# Patient Record
Sex: FEMALE | Race: ASIAN | NOT HISPANIC OR LATINO | Employment: OTHER | URBAN - METROPOLITAN AREA
[De-identification: names, ages, dates, MRNs, and addresses within clinical notes are randomized per-mention and may not be internally consistent; named-entity substitution may affect disease eponyms.]

---

## 2017-01-07 DIAGNOSIS — F41.1 GENERALIZED ANXIETY DISORDER: Primary | ICD-10-CM

## 2017-01-09 RX ORDER — SERTRALINE HYDROCHLORIDE 100 MG/1
TABLET, FILM COATED ORAL
Qty: 90 TABLET | Refills: 0 | Status: SHIPPED | OUTPATIENT
Start: 2017-01-09 | End: 2018-07-25

## 2017-01-09 NOTE — TELEPHONE ENCOUNTER
OK'd one fill.   Needs to be seen - please have her schedule a OV in next 2 months.   GARLAND Brown, PA-C

## 2017-01-09 NOTE — TELEPHONE ENCOUNTER
Sertraline     Last Written Prescription Date: 8/2/16  Last Fill Quantity: 90, # refills: 1  Last Office Visit with [facility] primary care provider:  3/14/ 16       Last PHQ-9 score on record=   PHQ-9 SCORE 3/14/2016   Total Score 15                 Nell Knapp CMA

## 2017-01-09 NOTE — TELEPHONE ENCOUNTER
Routing refill request to provider for review/approval because:  Patient needs to be seen because:  Last office visit on 03/14/16 states to be seen in 6 months or sooner.  Dx is anxiety      Leanne Chin,RN  [facility]  [phone]

## 2017-01-13 NOTE — TELEPHONE ENCOUNTER
Patient returned call and was given the message below  States she will be starting school soon and will be without a car  Looking to change PCP's and may just go to a [facility] closer to her school  She will call back  Marita FELIX

## 2017-04-07 DIAGNOSIS — F41.1 GENERALIZED ANXIETY DISORDER: ICD-10-CM

## 2017-04-07 NOTE — TELEPHONE ENCOUNTER
Sertraline     Last Written Prescription Date: 1/9/17  Last Fill Quantity: 90, # refills: 0  Last Office Visit with [facility] primary care provider:  3/14/16        Last PHQ-9 score on record=   PHQ-9 SCORE 3/14/2016   Total Score 15             Nell Knapp CMA

## 2017-04-07 NOTE — LETTER
[facility]  [address] [place] [address]  [phone]        April 20, 2017  Ness Alamo  [address] [facility] [address]    Dear Ness,    I care about your health and have reviewed your health plan. I have reviewed your medical conditions, medication list, and lab results and am making recommendations based on this review, to better manage your health.    You are in particular need of attention regarding:  -Wellness (Physical) Visit     I am recommending that you:  Schedule a physical or a med check - it has been over a year since your last visit  Here is a list of Health Maintenance topics that are due now or due soon:  Health Maintenance Due   Topic Date Due     HPV IMMUNIZATION (1 of 3 - Female 3 Dose Series) 11/09/2005     Chlamydia Screening Lab - yearly  03/14/2017     RICHELLE QUESTIONNAIRE 1 YEAR  03/14/2017     Tetanus Vaccine - every 10 years  05/07/2017       Please call us at [phone] (or use [employer]) to address the above recommendations.     Thank you for trusting [facility] and we appreciate the opportunity to serve you.  We look forward to supporting your healthcare needs in the future.    Healthy Regards,    Ana Lai PA-C

## 2017-04-07 NOTE — TELEPHONE ENCOUNTER
Left message for patient to call clinic.  Please assist with appointment.  Thank you    Kayce Degroot RN

## 2017-04-07 NOTE — TELEPHONE ENCOUNTER
Routing refill request to provider for review/approval because:  Labs out of range:  PHQ -9 greater than 4    Kayce Degroot RN

## 2017-04-20 RX ORDER — SERTRALINE HYDROCHLORIDE 100 MG/1
TABLET, FILM COATED ORAL
Qty: 90 TABLET | Refills: 1 | OUTPATIENT
Start: 2017-04-20

## 2018-07-25 ENCOUNTER — OFFICE VISIT (OUTPATIENT)
Dept: FAMILY MEDICINE | Facility: CLINIC | Age: 23
End: 2018-07-25
Payer: COMMERCIAL

## 2018-07-25 VITALS
SYSTOLIC BLOOD PRESSURE: 114 MMHG | DIASTOLIC BLOOD PRESSURE: 70 MMHG | TEMPERATURE: 97.7 F | BODY MASS INDEX: 28.17 KG/M2 | WEIGHT: 159 LBS | HEART RATE: 76 BPM

## 2018-07-25 DIAGNOSIS — F41.1 GENERALIZED ANXIETY DISORDER: Primary | ICD-10-CM

## 2018-07-25 PROCEDURE — 99214 OFFICE O/P EST MOD 30 MIN: CPT | Performed by: FAMILY MEDICINE

## 2018-07-25 RX ORDER — SERTRALINE HYDROCHLORIDE 100 MG/1
150 TABLET, FILM COATED ORAL DAILY
Qty: 90 TABLET | Refills: 2 | Status: SHIPPED | OUTPATIENT
Start: 2018-07-25 | End: 2019-01-20

## 2018-07-25 ASSESSMENT — PATIENT HEALTH QUESTIONNAIRE - PHQ9: 5. POOR APPETITE OR OVEREATING: SEVERAL DAYS

## 2018-07-25 ASSESSMENT — ANXIETY QUESTIONNAIRES
IF YOU CHECKED OFF ANY PROBLEMS ON THIS QUESTIONNAIRE, HOW DIFFICULT HAVE THESE PROBLEMS MADE IT FOR YOU TO DO YOUR WORK, TAKE CARE OF THINGS AT HOME, OR GET ALONG WITH OTHER PEOPLE: NOT DIFFICULT AT ALL
1. FEELING NERVOUS, ANXIOUS, OR ON EDGE: SEVERAL DAYS
5. BEING SO RESTLESS THAT IT IS HARD TO SIT STILL: NOT AT ALL
3. WORRYING TOO MUCH ABOUT DIFFERENT THINGS: NOT AT ALL
2. NOT BEING ABLE TO STOP OR CONTROL WORRYING: SEVERAL DAYS
GAD7 TOTAL SCORE: 3
6. BECOMING EASILY ANNOYED OR IRRITABLE: NOT AT ALL
7. FEELING AFRAID AS IF SOMETHING AWFUL MIGHT HAPPEN: NOT AT ALL

## 2018-07-25 NOTE — PROGRESS NOTES
SUBJECTIVE:   Ness Alamo is a 22 year old female who presents to clinic today for the following health issues:      Depression and Anxiety Follow-Up    Status since last visit: has been on 150mg for about one year now - increased by doc outside of [place]     Other associated symptoms:None    Complicating factors:     Significant life event: No - on college so moves a lot      Current substance abuse: None    PHQ-9 8/27/2015 3/14/2016 7/25/2018   Total Score 11 15 10   Q9: Suicide Ideation Not at all Not at all Not at all     RICHELLE-7 SCORE 8/27/2015 3/14/2016 7/25/2018   Total Score 19 9 3       PHQ-9  English  PHQ-9   Any Language  RICHELLE-7  Suicide Assessment Five-step Evaluation and Treatment (SAFE-T)    Amount of exercise or physical activity: None    Problems taking medications regularly: No    Medication side effects: none    Diet: non dairy     She denies any side effects of the medication.  She thinks her anxiety is better but at times she feels slightly depressed but no suicidal thoughts.      Problem list and histories reviewed & adjusted, as indicated.  Additional history: as documented        Reviewed and updated as needed this visit by clinical staff  Tobacco  Allergies  Meds  Fam Hx  Soc Hx      Reviewed and updated as needed this visit by Provider         ROS:  CONSTITUTIONAL: NEGATIVE for fever, chills, change in weight  ENT/MOUTH: NEGATIVE for ear, mouth and throat problems  RESP: NEGATIVE for significant cough or SOB  CV: NEGATIVE for chest pain, palpitations or peripheral edema  PSYCHIATRIC: NEGATIVE for changes in mood or affect    OBJECTIVE:                                                    /70  Pulse 76  Temp 97.7  F (36.5  C) (Tympanic)  Wt 159 lb (72.1 kg)  BMI 28.17 kg/m2  Body mass index is 28.17 kg/(m^2).   GENERAL: healthy, alert, well nourished, well hydrated, no distress  NECK: no tenderness, no adenopathy, no asymmetry, no masses, no stiffness; thyroid- normal to  palpation  RESP: lungs clear to auscultation - no rales, no rhonchi, no wheezes  CV: regular rates and rhythm, normal S1 S2, no S3 or S4 and no murmur, no click or rub -  PSYCH: Alert and oriented times 3; speech- coherent , normal rate and volume; able to articulate logical thoughts, able to abstract reason, no tangential thoughts, no hallucinations or delusions, affect- normal         ASSESSMENT/PLAN:                                                        ICD-10-CM    1. Generalized anxiety disorder F41.1 sertraline (ZOLOFT) 100 MG tablet       Patient has underlying generalized anxiety issues.  She is currently on 150 mg of Zoloft.  She thinks it has been helping.  I refilled her medication 450 mg.  She is on a high dose of medication I will suggest to follow-up every 6 months for recheck in person.  Side effects were discussed with the patient in detail.    Tello Kelley MD  [facility]

## 2018-07-25 NOTE — MR AVS SNAPSHOT
After Visit Summary   7/25/2018    Ness Alamo    MRN: [id number]           Patient Information     Date Of Birth          1995        Visit Information        Provider Department      7/25/2018 4:20 PM Tello Kelley MD [facility] [place]        Today's Diagnoses     Generalized anxiety disorder    -  1       Follow-ups after your visit        Follow-up notes from your care team     Return in about 6 months (around 1/25/2019) for Mood recheck.      Your next 10 appointments already scheduled     Jan 02, 2019 12:20 PM CST   Office Visit with Tello Kelley MD   [facility] [place] ([facility])    [address] [place] [address] [phone]           Bring a current list of meds and any records pertaining to this visit. For Physicals, please bring immunization records and any forms needing to be filled out. Please arrive 10 minutes early to complete paperwork.              Who to contact     If you have questions or need follow up information about today's clinic visit or your schedule please contact [facility] [place] directly at [phone].  Normal or non-critical lab and imaging results will be communicated to you by MyChart, letter or phone within 4 business days after the clinic has received the results. If you do not hear from us within 7 days, please contact the clinic through SABIAhart or phone. If you have a critical or abnormal lab result, we will notify you by phone as soon as possible.  Submit refill requests through Perfectore or call your pharmacy and they will forward the refill request to us. Please allow 3 business days for your refill to be completed.          Additional Information About Your Visit        MyChart Information     Perfectore gives you secure access to your electronic health record. If you see a primary care provider, you can also send messages to your care team and make appointments. If you have  questions, please call your primary care clinic.  If you do not have a primary care provider, please call [phone] and they will assist you.        Care EveryWhere ID     This is your Care EveryWhere ID. This could be used by other organizations to access your [place] medical records  [id number]        Your Vitals Were     Pulse Temperature BMI (Body Mass Index)             76 97.7  F (36.5  C) (Tympanic) 28.17 kg/m2          Blood Pressure from Last 3 Encounters:   07/25/18 114/70   03/14/16 108/58   08/27/15 124/70    Weight from Last 3 Encounters:   07/25/18 159 lb (72.1 kg)   03/14/16 141 lb (64 kg)   08/27/15 136 lb (61.7 kg)              Today, you had the following     No orders found for display         Today's Medication Changes          These changes are accurate as of 7/25/18  4:34 PM.  If you have any questions, ask your nurse or doctor.               These medicines have changed or have updated prescriptions.        Dose/Directions    sertraline 100 MG tablet   Commonly known as:  ZOLOFT   This may have changed:  See the new instructions.   Used for:  Generalized anxiety disorder   Changed by:  Tello Kelley MD        Dose:  150 mg   Take 1.5 tablets (150 mg) by mouth daily   Quantity:  90 tablet   Refills:  2            Where to get your medicines      These medications were sent to [facility] [address] [address]     Phone:  [phone]     sertraline 100 MG tablet                Primary Care Provider Fax #    Provider Not In System [phone]                Equal Access to Services     [facility] Center[facility]ESSICA : Hadbroderick webb Someggan, waaxda luqadaha, qaybta kaalmapurvi moreno. So [facility] [phone].    ATENCIÓN: Si habla español, tiene a anthony disposición servicios gratuitos de asistencia lingüística. Llame al [phone].    We comply with applicable federal civil rights  laws and [place] laws. We do not discriminate on the basis of race, color, national origin, age, disability, sex, sexual orientation, or gender identity.            Thank you!     Thank you for choosing [facility] [place]  for your care. Our goal is always to provide you with excellent care. Hearing back from our patients is one way we can continue to improve our services. Please take a few minutes to complete the written survey that you may receive in the mail after your visit with us. Thank you!             Your Updated Medication List - Protect others around you: Learn how to safely use, store and throw away your medicines at www.disposemymeds.org.          This list is accurate as of 7/25/18  4:34 PM.  Always use your most recent med list.                   Brand Name Dispense Instructions for use Diagnosis    norgestim-eth estrad triphasic 0.18/0.215/0.25 MG-35 MCG per tablet    TRINESSA (28)    84 tablet    Take 1 tablet by mouth daily    Excessive or frequent menstruation       sertraline 100 MG tablet    ZOLOFT    90 tablet    Take 1.5 tablets (150 mg) by mouth daily    Generalized anxiety disorder

## 2018-07-26 ASSESSMENT — ANXIETY QUESTIONNAIRES: GAD7 TOTAL SCORE: 3

## 2018-07-26 ASSESSMENT — PATIENT HEALTH QUESTIONNAIRE - PHQ9: SUM OF ALL RESPONSES TO PHQ QUESTIONS 1-9: 10

## 2018-08-29 ENCOUNTER — OFFICE VISIT (OUTPATIENT)
Dept: FAMILY MEDICINE | Facility: CLINIC | Age: 23
End: 2018-08-29
Payer: COMMERCIAL

## 2018-08-29 VITALS
WEIGHT: 159.8 LBS | HEIGHT: 63 IN | SYSTOLIC BLOOD PRESSURE: 109 MMHG | HEART RATE: 83 BPM | BODY MASS INDEX: 28.31 KG/M2 | DIASTOLIC BLOOD PRESSURE: 68 MMHG | OXYGEN SATURATION: 95 %

## 2018-08-29 DIAGNOSIS — L72.9 SKIN CYST: Primary | ICD-10-CM

## 2018-08-29 PROCEDURE — 99213 OFFICE O/P EST LOW 20 MIN: CPT | Performed by: FAMILY MEDICINE

## 2018-08-29 NOTE — MR AVS SNAPSHOT
After Visit Summary   8/29/2018    Ness Alamo    MRN: [id number]           Patient Information     Date Of Birth          1995        Visit Information        Provider Department      8/29/2018 10:20 AM Tello Kelley MD [facility] [place]        Today's Diagnoses     Skin cyst    -  1       Follow-ups after your visit        Follow-up notes from your care team     Return in about 2 weeks (around 9/12/2018) for if symptom does not get better.      Your next 10 appointments already scheduled     Jan 02, 2019 12:20 PM CST   Office Visit with Tello Kelley MD   [facility] [place] ([facility]    [address] [address] [phone]           Bring a current list of meds and any records pertaining to this visit. For Physicals, please bring immunization records and any forms needing to be filled out. Please arrive 10 minutes early to complete paperwork.              Who to contact     If you have questions or need follow up information about today's clinic visit or your schedule please contact [facility] [place] directly at [phone].  Normal or non-critical lab and imaging results will be communicated to you by MyChart, letter or phone within 4 business days after the clinic has received the results. If you do not hear from us within 7 days, please contact the clinic through Arkeia Softwarehart or phone. If you have a critical or abnormal lab result, we will notify you by phone as soon as possible.  Submit refill requests through Startcapps or call your pharmacy and they will forward the refill request to us. Please allow 3 business days for your refill to be completed.          Additional Information About Your Visit        MyChart Information     Startcapps gives you secure access to your electronic health record. If you see a primary care provider, you can also send messages to your care team and make appointments. If you have  "questions, please call your primary care clinic.  If you do not have a primary care provider, please call [phone] and they will assist you.        Care EveryWhere ID     This is your Care EveryWhere ID. This could be used by other organizations to access your [place] medical records  [id number]        Your Vitals Were     Pulse Height Pulse Oximetry Breastfeeding? BMI (Body Mass Index)       83 5' 3\" (1.6 m) 95% No 28.31 kg/m2        Blood Pressure from Last 3 Encounters:   08/29/18 109/68   07/25/18 114/70   03/14/16 108/58    Weight from Last 3 Encounters:   08/29/18 159 lb 12.8 oz (72.5 kg)   07/25/18 159 lb (72.1 kg)   03/14/16 141 lb (64 kg)              Today, you had the following     No orders found for display       Primary Care Provider Office Phone # Fax #    Tello Kelley [phone]       [address]  [place] [address]        Equal Access to Services     [facility]: Hadii aad ku hadasho Soomaali, waaxda luqadaha, qaybta kaalmada adeegyada, waxолег payne . So [facility] [phone].    ATENCIÓN: Si habla español, tiene a anthony disposición servicios gratuitos de asistencia lingüística. Llame al [phone].    We comply with applicable federal civil rights laws and [place] laws. We do not discriminate on the basis of race, color, national origin, age, disability, sex, sexual orientation, or gender identity.            Thank you!     Thank you for choosing [facility] [place]  for your care. Our goal is always to provide you with excellent care. Hearing back from our patients is one way we can continue to improve our services. Please take a few minutes to complete the written survey that you may receive in the mail after your visit with us. Thank you!             Your Updated Medication List - Protect others around you: Learn how to safely use, store and throw away your medicines at www.disposemymeds.org.          This list is accurate " as of 8/29/18 10:58 AM.  Always use your most recent med list.                   Brand Name Dispense Instructions for use Diagnosis    sertraline 100 MG tablet    ZOLOFT    90 tablet    Take 1.5 tablets (150 mg) by mouth daily    Generalized anxiety disorder

## 2018-08-29 NOTE — PROGRESS NOTES
SUBJECTIVE:   Ness Alamo is a 22 year old female who presents to clinic today for the following health issues:      Concern - Derm problem   Onset: x 3-4 weeks     Description:   Location - behind rt ear   Notice some discomfort, itchy, sensitive to the touch, round, mild discoloration, mild redness     Intensity: 0/10    Progression of Symptoms:  same    Accompanying Signs & Symptoms:  Denies pus, drainage, bleeding, fever, chills, headaches,     Previous history of similar problem:   YES     Precipitating factors:   Worsened by:     Alleviating factors:  Improved by:     Therapies Tried and outcome: Warm compress- shows mild improvement         Problem list and histories reviewed & adjusted, as indicated.  Additional history: as documented    Patient Active Problem List   Diagnosis     Exotropia     CARDIOVASCULAR SCREENING; LDL GOAL LESS THAN 160     Contraceptive management     Generalized anxiety disorder     Past Surgical History:   Procedure Laterality Date     EYE SURGERY Left 2004    Fix lazy eye       Social History   Substance Use Topics     Smoking status: Never Smoker     Smokeless tobacco: Never Used     Alcohol use 0.0 oz/week     0 Standard drinks or equivalent per week     Family History   Problem Relation Age of Onset     Diabetes Father      Hypertension Father      HEART DISEASE Maternal Grandfather      MI         Current Outpatient Prescriptions   Medication Sig Dispense Refill     sertraline (ZOLOFT) 100 MG tablet Take 1.5 tablets (150 mg) by mouth daily 90 tablet 2       Reviewed and updated as needed this visit by clinical staff       Reviewed and updated as needed this visit by Provider         ROS:  CONSTITUTIONAL: NEGATIVE for fever, chills, change in weight  ENT/MOUTH: NEGATIVE for ear, mouth and throat problems  RESP: NEGATIVE for significant cough or SOB  CV: NEGATIVE for chest pain, palpitations or peripheral edema    OBJECTIVE:                                                    BP  "109/68 (BP Location: Left arm, Patient Position: Chair, Cuff Size: Adult Regular)  Pulse 83  Ht 5' 3\" (1.6 m)  Wt 159 lb 12.8 oz (72.5 kg)  SpO2 95%  Breastfeeding? No  BMI 28.31 kg/m2  Body mass index is 28.31 kg/(m^2).   GENERAL: healthy, alert, well nourished, well hydrated, no distress  HENT: Small cyst behind the ear which is not erythematous.  No bulging.  NECK: no tenderness, no adenopathy, no asymmetry, no masses, no stiffness; thyroid- normal to palpation  RESP: lungs clear to auscultation - no rales, no rhonchi, no wheezes  CV: regular rates and rhythm, normal S1 S2, no S3 or S4 and no murmur, no click or rub -         ASSESSMENT/PLAN:                                                        ICD-10-CM    1. Skin cyst L72.9        Patient has a benign cyst behind the ear .  I would suggest just continue observation.  Currently there is no fluctuation or any bulge which can be opened and removed.  It is already improving she has poked it yesterday and she noticed relief.  This getting bigger in size or any pain she will let us know we will make further recommendations.  Currently I would suggest observation.    Tello Kelley MD  [facility]  "

## 2019-01-20 DIAGNOSIS — F41.1 GENERALIZED ANXIETY DISORDER: ICD-10-CM

## 2019-01-21 RX ORDER — SERTRALINE HYDROCHLORIDE 100 MG/1
TABLET, FILM COATED ORAL
Qty: 135 TABLET | Refills: 0 | Status: SHIPPED | OUTPATIENT
Start: 2019-01-21 | End: 2019-10-15

## 2019-01-21 NOTE — TELEPHONE ENCOUNTER
Medication is being filled for 1 time refill only due to:  patient is due for med check this month Violeta Covarrubias RN- Triage [employer]

## 2019-01-21 NOTE — TELEPHONE ENCOUNTER
"Requested Prescriptions   Pending Prescriptions Disp Refills     sertraline (ZOLOFT) 100 MG tablet [Pharmacy Med Name: SERTRALINE HCL TABS 100MG] 90 tablet 2     Sig: TAKE ONE AND ONE-HALF TABLETS DAILY    SSRIs Protocol Passed - 1/20/2019 11:56 PM       Passed - Recent (12 mo) or future (30 days) visit within the authorizing provider's specialty    Patient had office visit in the last 12 months or has a visit in the next 30 days with authorizing provider or within the authorizing provider's specialty.  See \"Patient Info\" tab in inbasket, or \"Choose Columns\" in Meds & Orders section of the refill encounter.             Passed - Medication is active on med list       Passed - Patient is age 18 or older       Passed - No active pregnancy on record       Passed - No positive pregnancy test in last 12 months        sertraline (ZOLOFT) 100 MG tablet 90 tablet 2 7/25/2018       Last Written Prescription Date:  07/25/2018  Last Fill Quantity: 90,  # refills: 2   Last office visit: 8/29/2018 with prescribing provider:  Dr. Kelley   Future Office Visit:  Unknown     "

## 2019-08-22 ENCOUNTER — TELEPHONE (OUTPATIENT)
Dept: FAMILY MEDICINE | Facility: CLINIC | Age: 24
End: 2019-08-22

## 2019-08-22 NOTE — LETTER
August 22, 2019      Ness Alamo  [address]  [place] [address]        Dear Ness,    I care about your health and have reviewed your health plan. I have reviewed your medical conditions, medication list, and lab results and am making recommendations based on this review, to better manage your health.    You are in particular need of attention regarding:  -Cervical Cancer Screening    I am recommending that you:  -schedule a PHYSICAL/PAP SMEAR which is due    Here is a list of Health Maintenance topics that are due now or due soon:  Health Maintenance Due   Topic Date Due     Chlamydia Screening  1995     HIV Screening  11/09/2009     HPV Vaccine (1 - Female 3-dose series) 11/09/2009     Preventive Care Visit  10/31/2015     Depression Assessment  01/25/2019     PAP Smear  03/14/2019     Anxiety Assessment  07/25/2019       Please call us at [phone] (or use Searchmetrics) to address the above recommendations.     Thank you for trusting [facility] and we appreciate the opportunity to serve you.  We look forward to supporting your healthcare needs in the future.    Healthy Regards,    Tello Kelley MD/km

## 2019-08-22 NOTE — TELEPHONE ENCOUNTER
Needs of attention regarding:  -Cervical Cancer Screening    Health Maintenance Topics with due status: Overdue       Topic Date Due    CHLAMYDIA SCREENING 1995    HIV SCREENING 11/09/2009    HPV IMMUNIZATION 11/09/2009    PREVENTIVE CARE VISIT 10/31/2015    PHQ-9 01/25/2019    PAP 03/14/2019    RICHELLE ASSESSMENT 07/25/2019       Communication:  See Letter

## 2019-10-03 ENCOUNTER — HEALTH MAINTENANCE LETTER (OUTPATIENT)
Age: 24
End: 2019-10-03

## 2019-10-15 ENCOUNTER — TELEPHONE (OUTPATIENT)
Dept: FAMILY MEDICINE | Facility: CLINIC | Age: 24
End: 2019-10-15

## 2019-10-15 ENCOUNTER — OFFICE VISIT (OUTPATIENT)
Dept: FAMILY MEDICINE | Facility: CLINIC | Age: 24
End: 2019-10-15
Payer: COMMERCIAL

## 2019-10-15 VITALS
TEMPERATURE: 98.2 F | OXYGEN SATURATION: 98 % | BODY MASS INDEX: 30.83 KG/M2 | HEIGHT: 63 IN | HEART RATE: 80 BPM | SYSTOLIC BLOOD PRESSURE: 106 MMHG | DIASTOLIC BLOOD PRESSURE: 68 MMHG | WEIGHT: 174 LBS

## 2019-10-15 DIAGNOSIS — B37.31 VULVAR CANDIDIASIS: ICD-10-CM

## 2019-10-15 DIAGNOSIS — F41.1 GENERALIZED ANXIETY DISORDER: ICD-10-CM

## 2019-10-15 DIAGNOSIS — F32.1 CURRENT MODERATE EPISODE OF MAJOR DEPRESSIVE DISORDER, UNSPECIFIED WHETHER RECURRENT (H): ICD-10-CM

## 2019-10-15 DIAGNOSIS — B37.31 VULVAR CANDIDIASIS: Primary | ICD-10-CM

## 2019-10-15 DIAGNOSIS — N92.0 MENORRHAGIA WITH REGULAR CYCLE: ICD-10-CM

## 2019-10-15 LAB
SPECIMEN SOURCE: NORMAL
WET PREP SPEC: NORMAL

## 2019-10-15 PROCEDURE — 87210 SMEAR WET MOUNT SALINE/INK: CPT | Performed by: FAMILY MEDICINE

## 2019-10-15 PROCEDURE — 99214 OFFICE O/P EST MOD 30 MIN: CPT | Performed by: FAMILY MEDICINE

## 2019-10-15 RX ORDER — NYSTATIN 100000 U/G
CREAM TOPICAL
Qty: 30 G | Refills: 0 | Status: SHIPPED | OUTPATIENT
Start: 2019-10-15 | End: 2020-03-05

## 2019-10-15 RX ORDER — SERTRALINE HYDROCHLORIDE 100 MG/1
150 TABLET, FILM COATED ORAL DAILY
Qty: 135 TABLET | Refills: 3 | Status: SHIPPED | OUTPATIENT
Start: 2019-10-15 | End: 2020-03-05

## 2019-10-15 RX ORDER — BUSPIRONE HYDROCHLORIDE 15 MG/1
15 TABLET ORAL 2 TIMES DAILY
Qty: 180 TABLET | Refills: 3 | Status: SHIPPED | OUTPATIENT
Start: 2019-10-15 | End: 2020-03-05

## 2019-10-15 RX ORDER — NYSTATIN 100000 U/G
CREAM TOPICAL
Qty: 30 G | Refills: 0 | Status: SHIPPED | OUTPATIENT
Start: 2019-10-15 | End: 2019-10-15

## 2019-10-15 RX ORDER — BUSPIRONE HYDROCHLORIDE 15 MG/1
15 TABLET ORAL 2 TIMES DAILY
COMMUNITY
End: 2019-10-15

## 2019-10-15 ASSESSMENT — ANXIETY QUESTIONNAIRES
3. WORRYING TOO MUCH ABOUT DIFFERENT THINGS: NEARLY EVERY DAY
IF YOU CHECKED OFF ANY PROBLEMS ON THIS QUESTIONNAIRE, HOW DIFFICULT HAVE THESE PROBLEMS MADE IT FOR YOU TO DO YOUR WORK, TAKE CARE OF THINGS AT HOME, OR GET ALONG WITH OTHER PEOPLE: EXTREMELY DIFFICULT
1. FEELING NERVOUS, ANXIOUS, OR ON EDGE: NEARLY EVERY DAY
7. FEELING AFRAID AS IF SOMETHING AWFUL MIGHT HAPPEN: MORE THAN HALF THE DAYS
6. BECOMING EASILY ANNOYED OR IRRITABLE: NEARLY EVERY DAY
GAD7 TOTAL SCORE: 18
5. BEING SO RESTLESS THAT IT IS HARD TO SIT STILL: MORE THAN HALF THE DAYS
2. NOT BEING ABLE TO STOP OR CONTROL WORRYING: MORE THAN HALF THE DAYS

## 2019-10-15 ASSESSMENT — MIFFLIN-ST. JEOR: SCORE: 1508.39

## 2019-10-15 ASSESSMENT — PATIENT HEALTH QUESTIONNAIRE - PHQ9
SUM OF ALL RESPONSES TO PHQ QUESTIONS 1-9: 18
5. POOR APPETITE OR OVEREATING: NEARLY EVERY DAY

## 2019-10-15 NOTE — TELEPHONE ENCOUNTER
Patient is requesting to  a paper copy of: nystatin (MYCOSTATIN) [id number] UNIT/GM external cream. Pharmacy has not received this due to our eprescribing system being down.    Please place at  for patient.    Thanks!  Cirilo Nicole

## 2019-10-15 NOTE — PATIENT INSTRUCTIONS
[facility]     Discharged by : Georgette ARAYA MA    If you have any questions regarding your visit please contact your care team:     Team Larisa              Clinic Hours Telephone Number     Dr. Zac Dobson, [place]   7am-7pm  Monday - Thursday   7am-5pm  Fridays  [phone]   (Appointment scheduling available 24/7)     RN Line  [phone] option 2     Urgent Care - [place] and [place] - 11am-9pm Monday-Friday Saturday-Sunday- 9am-5pm     [place] -   5pm-9pm Monday-Friday Saturday-Sunday- 9am-5pm    [phone] - [place]    [phone] - [place]     For a Price Quote for your services, please call our [employer] Price Line at [phone].     What options do I have for visits at the clinic other than the traditional office visit?     To expand how we care for you, many of our providers are utilizing electronic visits (e-visits) and telephone visits, when medically appropriate, for interactions with their patients rather than a visit in the clinic. We also offer nurse visits for many medical concerns. Just like any other service, we will bill your insurance company for this type of visit based on time spent on the phone with your provider. Not all insurance companies cover these visits. Please check with your medical insurance if this type of visit is covered. You will be responsible for any charges that are not paid by your insurance.     E-visits via Pigit: generally incur a $45.00 fee.     Telephone visits:  Time spent on the phone: *charged based on time that is spent on the phone in increments of 10 minutes. Estimated cost:   5-10 mins $30.00   11-20 mins. $59.00   21-30 mins. $85.00       Use Pole Start (secure email communication and access to your chart) to send your primary care provider a message or make an appointment. Ask someone on your Team how to sign up for Pole Start.     As always, Thank you for trusting us with  your health care needs!      [facility]:    Scheduling Appointments  Thony Anderson [facility]  Call: [phone]    D HanisImelda garcia, [facility]  Call: [phone]    [facility]  Call: [phone]    For Gastroenterology referrals   [facility]   Clinics and Surgery Center, 4th Floor   [address] [place] [address]   Appointments: [phone]    WHERE TO GO FOR CARE?    Clinic    Make an appointment if you:       Are sick (cold, cough, flu, sore throat, earache or in pain).       Have a small injury (sprain, small cut, burn or broken bone).       Need a physical exam, Pap smear, vaccine or prescription refill.       Have questions about your health or medicines.    To reach us:      Call [phone] ([phone]). Open 24 hours every day. (For counseling services, call [phone].)    Log into [employer] at [employer]. (Visit [email or web address] to create an account.) Hospital emergency room    An emergency is a serious or life- threatening problem that must be treated right away.    Call 502 or get to the hospital if you have:      Very bad or sudden:            - Chest pain or pressure         - Bleeding         - Head or belly pain         - Dizziness or trouble seeing, walking or                          Speaking      Problems breathing      Blood in your vomit or you are coughing up blood      A major injury (knocked out, loss of a finger or limb, rape, broken bone protruding from skin)    A mental health crisis. (Or call the Mental Health Crisis line at 1-301.528.5298 or Suicide Prevention Hotline at 1-262.219.3501.)    Open 24 hours every day. You don't need an appointment.     Urgent care    Visit urgent care for sickness or small injuries when the clinic is closed. You don't need an appointment. To check hours or find an urgent care near you, visit [email or web address]. Online care    Get online care  from [facility] for more than 70 common problems, like colds, allergies and infections. Open 24 hours every day at:   [email or web address]   Need help deciding?    For advice about where to be seen, you may call your clinic and ask to speak with a nurse. We're here for you 24 hours every day.         If you are deaf or hard of hearing, please let us know. We provide many free services including sign language interpreters, oral interpreters, TTYs, telephone amplifiers, note takers and written materials.

## 2019-10-15 NOTE — PROGRESS NOTES
Subjective     Ness Alamo is a 23 year old female who presents to clinic today for the following health issues:    HPI     New Patient/Transfer of Care from [facility]    Depression and Anxiety Follow-Up    How are you doing with your depression since your last visit? Worsened - due to running out of refills    How are you doing with your anxiety since your last visit?  Worsened     Are you having other symptoms that might be associated with depression or anxiety? Yes:  Sleeping too much- not restful, can't handle stress    Have you had a significant life event? OTHER: New job- [employer]     Do you have any concerns with your use of alcohol or other drugs? No     Patient just started seeing a therapist     Previously taking Zoloft 150mg daily and Buspar 15mg BID (started recently), however found out that her insurance wouldn't cover her previous clinic.  Ran out of meds because she couldn't get in for follow up.      Social History     Tobacco Use     Smoking status: Never Smoker     Smokeless tobacco: Never Used   Substance Use Topics     Alcohol use: Yes     Alcohol/week: 0.0 standard drinks     Drug use: No     PHQ 8/27/2015 3/14/2016 7/25/2018   PHQ-9 Total Score 11 15 10   Q9: Thoughts of better off dead/self-harm past 2 weeks Not at all Not at all Not at all     RICHELLE-7 SCORE 8/27/2015 3/14/2016 7/25/2018   Total Score 19 9 3     In the past two weeks have you had thoughts of suicide or self-harm?  No.    Do you have concerns about your personal safety or the safety of others?   No    Suicide Assessment Five-step Evaluation and Treatment (SAFE-T)      How many servings of fruits and vegetables do you eat daily?  0-1    On average, how many sweetened beverages do you drink each day (soda, juice, sweet tea, etc)?   2  How many days per week do you miss taking your medication? Been without medication x 2-3 months now due to no refill    What makes it hard for you to take your medications?  No  refills    Vaginal Symptoms  Onset: 1 week    Description:  Vaginal Discharge: none   Itching (Pruritis): YES  Burning sensation:  no   Odor: YES    Accompanying Signs & Symptoms:  Pain with Urination: no   Abdominal Pain: YES- cramps  Fever: no     History:   Sexually active: YES- female partners  New Partner: no   Possibility of Pregnancy:  No    Precipitating factors:   Recent Antibiotic Use: no     Alleviating factors:  None    Therapies Tried and outcome: None    Patient has never had a pap smear.  She has never had vaginal intercourse.  Another doctor attempted a pap in the past, but she was unable to tolerate the procedure, so it was aborted.      She was previously on OCPs to help control menorrhagia.  Her previous PCP refused to continue to refill the OCPs because she could not complete a pap smear.        Patient Active Problem List   Diagnosis     Exotropia     CARDIOVASCULAR SCREENING; LDL GOAL LESS THAN 160     Generalized anxiety disorder     Current moderate episode of major depressive disorder, unspecified whether recurrent (H)     Menorrhagia with regular cycle     Past Surgical History:   Procedure Laterality Date     EYE SURGERY Left 2004    Fix lazy eye       Social History     Tobacco Use     Smoking status: Never Smoker     Smokeless tobacco: Never Used   Substance Use Topics     Alcohol use: Yes     Alcohol/week: 0.0 standard drinks     Family History   Problem Relation Age of Onset     Diabetes Father      Hypertension Father      Heart Disease Maternal Grandfather         MI           Reviewed and updated as needed this visit by Provider         Review of Systems   ROS COMP: Constitutional, HEENT, cardiovascular, pulmonary, GI, , musculoskeletal, neuro, skin, endocrine and psych systems are negative, except as otherwise noted.      Objective    /68 (BP Location: Right arm, Patient Position: Sitting, Cuff Size: Adult Regular)   Pulse 80   Temp 98.2  F (36.8  C) (Oral)   Ht 1.6 m  "(5' 3\")   Wt 78.9 kg (174 lb)   LMP 08/15/2019   SpO2 98%   BMI 30.82 kg/m    Body mass index is 30.82 kg/m .  Physical Exam   GENERAL: healthy, alert and no distress  RESP: lungs clear to auscultation - no rales, rhonchi or wheezes  CV: regular rates and rhythm, normal S1 S2, no S3 or S4 and no murmur, click or rub   (female): Labia minora with erythema anteriorly and some mild white, curdy discharge.  Internal exam deferred per pt preference.      Diagnostic Test Results:  Results for orders placed or performed in visit on 10/15/19 (from the past 24 hour(s))   Wet prep   Result Value Ref Range    Specimen Description Vagina     Wet Prep No Trichomonas seen     Wet Prep No clue cells seen     Wet Prep No yeast seen     Wet Prep No WBC's seen            Assessment & Plan     1. Generalized anxiety disorder  2. Current moderate episode of major depressive disorder, unspecified whether recurrent (H)  - Re-start Zoloft and Buspar  - Suggested considering slow start of Zoloft (50mg daily for a week, then 100mg, then 150mg)  - Patient is currently seeing a therapist  - sertraline (ZOLOFT) 100 MG tablet; Take 1.5 tablets (150 mg) by mouth daily  Dispense: 135 tablet; Refill: 3  - busPIRone (BUSPAR) 15 MG tablet; Take 1 tablet (15 mg) by mouth 2 times daily  Dispense: 180 tablet; Refill: 3    3. Vulvar candidiasis  - Wet prep negative, however external exam is consistent with candidal infection  - Will treat with topical antifungal   - Wet prep  - nystatin (MYCOSTATIN) 132303 UNIT/GM external cream; Apply to vaginal area 2-3 times daily for 7-10 days.  30g should last 2 weeks  Dispense: 30 g; Refill: 0    4. Menorrhagia with regular cycle  - Patient was previously on OCPs to help control cycle, however pills discontinued by her provider after pt was unable to complete pap smear (she is a virgin)  - Discussed that a pap smear is not needed to take OCPs and that I am more than happy to re-start these for her  - " Suggested considering waiting until Zoloft and Buspar are back in her system to avoid worsening anxiety/depression symptoms (potential side effects of OCPs)  - Will discuss further at her next visit if she'd like to re-start OCPs      Return in about 4 weeks (around 11/12/2019) for Depression/anxiety recheck . and consider re-starting OCPs    Jackie Echeverria,   [facility]

## 2019-10-15 NOTE — TELEPHONE ENCOUNTER
RX printed. Signed by Dr. Echeverria.  LM for patient that RX was walked to .    En Hernandez RN

## 2019-10-16 ASSESSMENT — ANXIETY QUESTIONNAIRES: GAD7 TOTAL SCORE: 18

## 2019-10-31 ENCOUNTER — HEALTH MAINTENANCE LETTER (OUTPATIENT)
Age: 24
End: 2019-10-31

## 2020-01-14 ENCOUNTER — MYC MEDICAL ADVICE (OUTPATIENT)
Dept: FAMILY MEDICINE | Facility: CLINIC | Age: 25
End: 2020-01-14

## 2020-01-14 NOTE — LETTER
[address] [id number] [phone]                                                                                                January 21, 2020    Ness Alamo  [address] [place] [address]        Dear Ms. Alamo,    We care about your health and have reviewed your health plan. You are in particular need of attention regarding:     - Depression   - Scheduling an Annual Physical with pap smear     Please call us at [phone], or use [employer], to address the above recommendations.     Thank you for trusting [facility] with your healthcare needs.     Healthy Regards,     Your Care Team

## 2020-01-14 NOTE — TELEPHONE ENCOUNTER
Panel Management Review      Patient has the following on her problem list:     Depression / Dysthymia review    Measure:  Needs PHQ-9 score of 4 or less during index window.  Administer PHQ-9 and if score is 5 or more, send encounter to provider for next steps.    5 - 7 month window range:     PHQ-9 SCORE 3/14/2016 7/25/2018 10/15/2019   PHQ-9 Total Score 15 10 18       If PHQ-9 recheck is 5 or more, route to provider for next steps.    Patient is due for:  PHQ9 and DAP      Composite cancer screening  Chart review shows that this patient is due/due soon for the following Pap Smear  Summary:    Patient is due/failing the following:   PAP, PHQ9 and PHYSICAL    Action needed:   Patient needs office visit for Annual Physical and Depression F/u.    Type of outreach:    Sent Trello message.    Questions for provider review:    None                                                                                                                                   Georgette Mcclellan MA       Chart routed to Care Team .

## 2020-01-21 NOTE — TELEPHONE ENCOUNTER
"RN went back into room to give patient a turkey sandwich to help bring blood sugar up.  PT was found locked in the shower curled up in a ball.  Security was called to help get patient back in bed.  PT still refused to eat.  Finally security talked pt into eating a few bites of his meal.  Pt asked if he was suicidal, he responded \"If I was I would have already done it.\"  RN responded with that's not a direct answer to my question.  Pt then stated, \"If I was I would have taken a fork and stuck a fork in my eye, Do you see a fork in my eye? Does that answer your question?\"      Within adequate amount of time, sugar rechecked and at 42.  1/2 amp of D50 pushed and patient sugar back up to 203.      Dr. Cespedes, Dr. Holloway and [facility] called.    Dr. Cespedes said to hold dinner time insulin.    Dr. Holloway wanted pt to move rooms and re-consult [facility] to see.  PT is refusing to move rooms at this time.      [facility] aware and is to come talk to the patient tonight.        aware of situation.    " Panel Management Review  Summary:    Type of outreach:    Sent letter.    Encounter routed to No Action Needed.                                                                                                                                 Georgette Mcclellan MA

## 2020-03-05 ENCOUNTER — OFFICE VISIT (OUTPATIENT)
Dept: FAMILY MEDICINE | Facility: CLINIC | Age: 25
End: 2020-03-05
Payer: COMMERCIAL

## 2020-03-05 VITALS
HEIGHT: 64 IN | WEIGHT: 170 LBS | DIASTOLIC BLOOD PRESSURE: 80 MMHG | BODY MASS INDEX: 29.02 KG/M2 | SYSTOLIC BLOOD PRESSURE: 130 MMHG

## 2020-03-05 DIAGNOSIS — F32.1 CURRENT MODERATE EPISODE OF MAJOR DEPRESSIVE DISORDER, UNSPECIFIED WHETHER RECURRENT (H): ICD-10-CM

## 2020-03-05 DIAGNOSIS — F41.1 GENERALIZED ANXIETY DISORDER: ICD-10-CM

## 2020-03-05 PROCEDURE — 96127 BRIEF EMOTIONAL/BEHAV ASSMT: CPT | Mod: 59 | Performed by: FAMILY MEDICINE

## 2020-03-05 PROCEDURE — 99214 OFFICE O/P EST MOD 30 MIN: CPT | Mod: 25 | Performed by: FAMILY MEDICINE

## 2020-03-05 PROCEDURE — 90651 9VHPV VACCINE 2/3 DOSE IM: CPT | Performed by: FAMILY MEDICINE

## 2020-03-05 PROCEDURE — 90471 IMMUNIZATION ADMIN: CPT | Performed by: FAMILY MEDICINE

## 2020-03-05 RX ORDER — ESCITALOPRAM OXALATE 20 MG/1
20 TABLET ORAL DAILY
Qty: 90 TABLET | Refills: 0 | Status: SHIPPED | OUTPATIENT
Start: 2020-03-05 | End: 2020-04-09

## 2020-03-05 ASSESSMENT — ANXIETY QUESTIONNAIRES
5. BEING SO RESTLESS THAT IT IS HARD TO SIT STILL: SEVERAL DAYS
1. FEELING NERVOUS, ANXIOUS, OR ON EDGE: SEVERAL DAYS
3. WORRYING TOO MUCH ABOUT DIFFERENT THINGS: MORE THAN HALF THE DAYS
IF YOU CHECKED OFF ANY PROBLEMS ON THIS QUESTIONNAIRE, HOW DIFFICULT HAVE THESE PROBLEMS MADE IT FOR YOU TO DO YOUR WORK, TAKE CARE OF THINGS AT HOME, OR GET ALONG WITH OTHER PEOPLE: SOMEWHAT DIFFICULT
2. NOT BEING ABLE TO STOP OR CONTROL WORRYING: NEARLY EVERY DAY
GAD7 TOTAL SCORE: 15
6. BECOMING EASILY ANNOYED OR IRRITABLE: NEARLY EVERY DAY
7. FEELING AFRAID AS IF SOMETHING AWFUL MIGHT HAPPEN: MORE THAN HALF THE DAYS

## 2020-03-05 ASSESSMENT — PATIENT HEALTH QUESTIONNAIRE - PHQ9
SUM OF ALL RESPONSES TO PHQ QUESTIONS 1-9: 20
5. POOR APPETITE OR OVEREATING: NEARLY EVERY DAY

## 2020-03-05 ASSESSMENT — MIFFLIN-ST. JEOR: SCORE: 1493.17

## 2020-03-05 NOTE — PROGRESS NOTES
Subjective     Ness Alamo is a 24 year old female who presents to clinic today for the following health issues:    HPI   Depression and Anxiety Follow-Up      How are you doing with your depression since your last visit? Worsened     How are you doing with your anxiety since your last visit?  About the same    Are you having other symptoms that might be associated with depression or anxiety? more irritable, more stressed out. Sleeping a lot, not a lot of energy. Loss of appetite.    Have you had a significant life event? switched jobs, took her bunny to the vet today.    Do you have any concerns with your use of alcohol or other drugs? No     Patient was last seen for this back in October 2019.  At that tie she was transferring care from another clinic and needed to restart her Zoloft and Buspar.  Both meds were restarted for her.  She decided to come off the medication because she was forgetting it a lot.  Just started it back up about a month ago.  She was previously on only Zoloft and the Buspar was added because the Zoloft stopped working.      Social History     Tobacco Use     Smoking status: Never Smoker     Smokeless tobacco: Never Used   Substance Use Topics     Alcohol use: Yes     Alcohol/week: 0.0 standard drinks     Comment: 2-3 drinks per week     Drug use: No     PHQ 7/25/2018 10/15/2019 3/5/2020   PHQ-9 Total Score 10 18 20   Q9: Thoughts of better off dead/self-harm past 2 weeks Not at all Not at all Not at all     RICHELLE-7 SCORE 7/25/2018 10/15/2019 3/5/2020   Total Score 3 18 15     In the past two weeks have you had thoughts of suicide or self-harm?  No.    Do you have concerns about your personal safety or the safety of others?   No      How many servings of fruits and vegetables do you eat daily?  0-1    On average, how many sweetened beverages do you drink each day (Examples: soda, juice, sweet tea, etc.  Do NOT count diet or artificially sweetened beverages)?   2  How many days per week do you  "miss taking your medication? 1    What makes it hard for you to take your medications?  remembering to take    Patient Active Problem List   Diagnosis     Exotropia     CARDIOVASCULAR SCREENING; LDL GOAL LESS THAN 160     Generalized anxiety disorder     Current moderate episode of major depressive disorder, unspecified whether recurrent (H)     Menorrhagia with regular cycle     Past Surgical History:   Procedure Laterality Date     EYE SURGERY Left 2004    Fix lazy eye       Social History     Tobacco Use     Smoking status: Never Smoker     Smokeless tobacco: Never Used   Substance Use Topics     Alcohol use: Yes     Alcohol/week: 0.0 standard drinks     Comment: 2-3 drinks per week     Family History   Problem Relation Age of Onset     Diabetes Father      Hypertension Father      Heart Disease Maternal Grandfather         MI           Review of Systems   ROS COMP: Constitutional, HEENT, cardiovascular, pulmonary, gi and gu systems are negative, except as otherwise noted.      Objective    /80 (Patient Position: Sitting, Cuff Size: Adult Regular)   Ht 1.613 m (5' 3.5\")   Wt 77.1 kg (170 lb)   BMI 29.64 kg/m    Body mass index is 29.64 kg/m .  Physical Exam   GENERAL: healthy, alert and no distress  PSYCH: mentation appears normal, affect flat and anxious        Assessment & Plan     1. Generalized anxiety disorder  2. Current moderate episode of major depressive disorder, unspecified whether recurrent (H)  - Patient was previously taking Zoloft 100mg daily and Buspar 15mg BID, however she persistently forgets to take meds and so has continued to have significant symptoms  - She'd like to simplify her medications   - Stop both the Zoloft and the Buspar and will switch to Lexapro instead   - escitalopram (LEXAPRO) 20 MG tablet; Take 1 tablet (20 mg) by mouth daily  Dispense: 90 tablet; Refill: 0     Greater than 50% of this visit was spent counseling face to face regarding the above diagnosis  Visit " lasted approximately 15 minutes      Return in about 4 weeks (around 4/2/2020) for Depression/Anxiety follow up .    Jackie Echeverria DO  [facility]

## 2020-03-06 ASSESSMENT — ANXIETY QUESTIONNAIRES: GAD7 TOTAL SCORE: 15

## 2020-04-09 ENCOUNTER — VIRTUAL VISIT (OUTPATIENT)
Dept: FAMILY MEDICINE | Facility: CLINIC | Age: 25
End: 2020-04-09
Payer: COMMERCIAL

## 2020-04-09 DIAGNOSIS — F41.1 GENERALIZED ANXIETY DISORDER: ICD-10-CM

## 2020-04-09 DIAGNOSIS — F32.1 CURRENT MODERATE EPISODE OF MAJOR DEPRESSIVE DISORDER, UNSPECIFIED WHETHER RECURRENT (H): Primary | ICD-10-CM

## 2020-04-09 DIAGNOSIS — G47.10 EXCESSIVE SLEEPINESS: ICD-10-CM

## 2020-04-09 PROCEDURE — 99214 OFFICE O/P EST MOD 30 MIN: CPT | Mod: TEL | Performed by: FAMILY MEDICINE

## 2020-04-09 RX ORDER — ESCITALOPRAM OXALATE 20 MG/1
20 TABLET ORAL DAILY
Qty: 90 TABLET | Refills: 1 | Status: SHIPPED | OUTPATIENT
Start: 2020-04-09 | End: 2020-11-17

## 2020-04-09 NOTE — PROGRESS NOTES
"Subjective     Ness Alamo is a 24 year old female who is being evaluated via a billable telephone visit.      The patient has been notified of following:     \"This telephone visit will be conducted via a call between you and your physician/provider. We have found that certain health care needs can be provided without the need for a physical exam.  This service lets us provide the care you need with a short phone conversation.  If a prescription is necessary we can send it directly to your pharmacy.  If lab work is needed we can place an order for that and you can then stop by our lab to have the test done at a later time.    Telephone visits are billed at different rates depending on your insurance coverage. During this emergency period, for some insurers they may be billed the same as an in-person visit.  Please reach out to your insurance provider with any questions.    If during the course of the call the physician/provider feels a telephone visit is not appropriate, you will not be charged for this service.\"    Patient has given verbal consent for Telephone visit?  Yes    How would you like to obtain your AVS? E-Mail (inform patient AVS not encrypted)  =======================================================================    Ness Alamo complains of   Chief Complaint   Patient presents with     Recheck Medication       ALLERGIES  Patient has no known allergies.    Depression and Anxiety Follow-Up    How are you doing with your depression since your last visit? Improved     How are you doing with your anxiety since your last visit?  No change    Are you having other symptoms that might be associated with depression or anxiety? Yes:  fatigue, sleeping a lot    Have you had a significant life event? OTHER: quarantine for COVID-19     Do you have any concerns with your use of alcohol or other drugs? No     At her last visit, we decided to simplify her medications for her since she was often forgetting to take them.  " We stopped Zoloft and Buspar and switched to Lexapro 20mg daily instead.  She feels like the Lexapro has been helpful.  She feels less sad.  Her friends have noticed that she's in a better mood.  She still feels like she's sleeping a lot.  She's having trouble staying asleep, but no trouble falling asleep.  She usually sleeps for about 9 hours and then feels like she needs to nap within 4 hours after waking up.  Her girlfriend is concerned that she might have sleep apnea.  She says it sounds like she's choking in her sleep.  Her dentist has been concerned about this in the past as well.      Social History     Tobacco Use     Smoking status: Never Smoker     Smokeless tobacco: Never Used   Substance Use Topics     Alcohol use: Yes     Alcohol/week: 0.0 standard drinks     Comment: 2-3 drinks per week     Drug use: No     PHQ 7/25/2018 10/15/2019 3/5/2020   PHQ-9 Total Score 10 18 20   Q9: Thoughts of better off dead/self-harm past 2 weeks Not at all Not at all Not at all     RICHELLE-7 SCORE 7/25/2018 10/15/2019 3/5/2020   Total Score 3 18 15       Patient Active Problem List   Diagnosis     Exotropia     CARDIOVASCULAR SCREENING; LDL GOAL LESS THAN 160     Generalized anxiety disorder     Current moderate episode of major depressive disorder, unspecified whether recurrent (H)     Menorrhagia with regular cycle     Past Surgical History:   Procedure Laterality Date     EYE SURGERY Left 2004    Fix lazy eye       Social History     Tobacco Use     Smoking status: Never Smoker     Smokeless tobacco: Never Used   Substance Use Topics     Alcohol use: Yes     Alcohol/week: 0.0 standard drinks     Comment: 2-3 drinks per week     Family History   Problem Relation Age of Onset     Diabetes Father      Hypertension Father      Heart Disease Maternal Grandfather         MI           Review of Systems   ROS COMP: Constitutional, HEENT, cardiovascular, pulmonary, gi and gu systems are negative, except as otherwise noted.        Objective   Reported vitals:  There were no vitals taken for this visit.   healthy, alert and no distress  Psych: Alert and oriented times 3; coherent speech, normal   rate and volume, able to articulate logical thoughts, able   to abstract reason, no tangential thoughts, no hallucinations   or delusions  Her affect is normal.       Assessment/Plan:  1. Current moderate episode of major depressive disorder, unspecified whether recurrent (H)  2. Generalized anxiety disorder  - Stable  - She'd like to continue Lexapro at current dose   - escitalopram (LEXAPRO) 20 MG tablet; Take 1 tablet (20 mg) by mouth daily  Dispense: 90 tablet; Refill: 1    3. Excessive sleepiness  - Symptoms suspicious for sleep apnea, so will refer to sleep center.  Hopefully she'll be able to do a home sleep study.    - SLEEP EVALUATION & MANAGEMENT REFERRAL - ADULT -[facility] - [place] [phone] (Age 15 and up); Future    Return in about 3 months (around 7/9/2020) for Depression/anxiety follow up .    Phone call duration:  14 minutes    Jackie Echeverria,

## 2020-06-19 VITALS — HEIGHT: 64 IN | BODY MASS INDEX: 29.02 KG/M2 | WEIGHT: 170 LBS

## 2020-06-19 ASSESSMENT — MIFFLIN-ST. JEOR: SCORE: 1493.17

## 2020-06-19 NOTE — PROGRESS NOTES
"Ness Alamo is a 24 year old female who is being evaluated via a billable video visit.      The patient has been notified of following:     \"This video visit will be conducted via a call between you and your physician/provider. We have found that certain health care needs can be provided without the need for an in-person physical exam.  This service lets us provide the care you need with a video conversation.  If a prescription is necessary we can send it directly to your pharmacy.  If lab work is needed we can place an order for that and you can then stop by our lab to have the test done at a later time.    Video visits are billed at different rates depending on your insurance coverage.  Please reach out to your insurance provider with any questions.    If during the course of the call the physician/provider feels a video visit is not appropriate, you will not be charged for this service.\"    Patient has given verbal consent for Video visit? Yes    Will anyone else be joining your video visit? No  {If patient encounters technical issues they should call [phone] :[phone]}      Video-Visit Details    Type of service:  Video Visit    Video Start Time: {video visit start/end time:152948}  Video End Time: {video visit start/end time:152948}    Originating Location (pt. Location): {video visit patient location:078405::\"Home\"}    Distant Location (provider location):  [facility]     Platform used for Video Visit: {Virtual Visit Platforms:601492::\"AnybodyOutThere\"}    {signature options:822855}     Referral #  Creation Date  Referral Status  Status Update     [id number]  04/09/2020  Closed  04/10/2020: Status History    Status Reason  Referral Type  Referral Reasons  Referral Class    Complete  Consultation  none  Internal    To Specialty  To Provider  To Location/Place of Service  To Department    none  none  [facility]  none    To Vendor  Referred By  By Location/Place of Service  By " Department    none  Jackie Echeverria DO  [facility]    Priority  Start Date  Expiration Date  Referral Entered By    Routine  04/09/2020 04/09/2021  Jackie Echeverria DO    Visits Requested  Visits Authorized  Visits Completed  Visits Scheduled    1  1  1  0    Procedure Information     Procedure Details   Procedure  Modifiers  Provider  Requested  Approved    [id number] - SLEEP EVALUATION & MANAGEMENT REFERRAL - [facility] [phone] (Age 15 and up)  None   1  1    Diagnosis Information     Diagnosis    G47.10 (ICD-10-CM) - Excessive sleepiness    Referral Notes   Number of Notes: 1   Type  Date  User  Summary  Attachment    Provider Comments  04/09/2020  4:19 PM  Jackie Echeverria DO  Provider Comments  -    Note     Please be aware that coverage of these services is subject to the terms and limitations of your health insurance plan.  Call member services at your health plan with any benefit or coverage questions.       Please bring the following to your appointment:     >>   List of current medications   >>   This referral request   >>   Any documents/labs given to you for this referral                  Referral Order     Order    SLEEP EVALUATION & MANAGEMENT REFERRAL - [facility] [phone] (Age 15 and up) (Order # [id number]) on 04/09/2020    View Encounter       Communications   (0)        None      At her last visit, we decided to simplify her medications for her since she was often forgetting to take them.  We stopped Zoloft and Buspar and switched to Lexapro 20mg daily instead.  She feels like the Lexapro has been helpful.  She feels less sad.  Her friends have noticed that she's in a better mood.  She still feels like she's sleeping a lot.  She's having trouble staying asleep, but no trouble falling asleep.  She usually sleeps for about 9 hours and then feels like she needs to nap within 4  hours after waking up.  Her girlfriend is concerned that she might have sleep apnea.  She says it sounds like she's choking in her sleep.  Her dentist has been concerned about this in the past as well.      [facility]   Outpatient Sleep Medicine Consultation  ***        Name: Ness Alamo MRN# [id number]   Age: 24 YOB: 1995      Date of Consultation: 4/9/2020  Consultation is requested by: Jackie Echeverria DO        Reason for Sleep Consult:      Excessive sleepiness          Assessment and Plan:    *** reports that this *** year history of difficulty with ***.  This has greatly impacted ***, and reports difficulty with driving ***or impact on work performance ***    SCALES       SLEEP APNEA: Stopbang score ***       INSOMNIA:  Insomnia severity score: ***       SLEEPINESS: [place] sleepiness scale: ***       RICHELLE 7: **     ***It's my impression that *** has a *** pretest probability of *** with symptoms of ***, in the setting of ***.  And the following diagnosis and plan has been developed...    Summary Sleep Diagnoses:      Snoring : ***    Obstructive sleep apnea ***    Insomnia :***    Hypertension***    Obesity: ***      Summary Recommendations:      ***Split-night sleep study with ***transcutaneous CO2 monitoring and baseline venous blood gas for possible hypoventilation ***or HST with stop bang score of ***.    Sleep diaries and follow up, or ***overnight oximetry or ***actigraphy or recommended.***    Treatment therapies and final decision through my chart communication ***or with virtual visit *** or face to face visit ***after results of testing. ***     Summary Counseling:  See instructions     Counseling included a comprehensive review of pathophysiology of ***possible obstructive sleep apnea and hypoventilation syndrome.***Diagnostic and therapeutic strategies as well as risks of inadequate therapy or no treatment were discussed.  *** Because of risk of hypoventilation we have recommended  polysomnography with CO2 monitoring for management with bilevel positive airway pressure support if she has nocturnal hypercapnia. ***  Cessation of sleep habits alcohol before bedtime avoidance of more than 3 caffeinated beverages      *** was provided with educational materials in instructions which were reviewed with this visit.                History of Present Illness:        *** reports ***    (snoing, snorting, gasping, waking up with panic or inc in HR, sleeping separately, sleep better if elevated or in chair, unable to lie flat and breathe with sleeping)      SLEEP COMPLAINTS: DENIES THE FOLLOWING: (unless indicated in RED):  Signs/symptoms:    Morning headaches or confusion-***  GERD or aspiration:yes***  Pain yes***  Bruxism: yes***  Parasomnia:  sw  yes***: confusional arousals  yes***  Narcolepsy:  Cataplexy  yes***, sleep paralysis yes*** , hallucinations  Yes***  RLS: : yes***      SLEEP-WAKE SCHEDULE:   Enters bed on weekdays***, exits bed on weekdays ***; enters bed at weekends ***exits bed on weekends  ***SLEEP LATENCY ***min(s)  and one awakening for bathroom with ***mins to return to sleep  Naps***  Sleep schedule is *** regular, ***irregular ***  Work schedule is ***  Estimated total sleep time ***  Naps:  Time of day ***, length ***, frequency ***   Activities in bed:  ***    Denies the following unless in RED:   Behaviors in bed:  clock watching *** to do list ***, worry about health or ability to complete next day's tasks ***  Housework *** employment ***anxiety or rumination,***waking up with heart pounding or racing***               Cardio-respiratory    Coexisting Lung disease:  ***           Coexisting Heart disease:  ***             Social, personal, family History and use of sleep aids or substances that affect sleep:     *** lives with ***.  Employed as *** or is a student ***. Sleep is disrupted by environmental factors from noise,***pets,***, children***, bedpartner***  Has bed  "partner been sleeping separately because of snoring or:  ***    Sleep Family Hx: ***        Snoring ***        RLS- ***   YESY - ***  Insomnia - ***  Parasomnia - ***  Chemical History: ***     Tobacco: ***      Uses {CAFFEINE INTAKE:338079}. Last caffeine intake is usually before ***    Supplements for wakefulness: ***    EtOH: *** {CAGE SCREEN FOR ETOH ABUSE:570219}  Recreational Drugs: ***   Sleep aids: no,***yes:***         Review of Systems:     A complete 10 point review of systems was negative other than HPI or as commented below:   *** has gained {NUMBERS 0-5,5-10,10-15:749681} pounds {TIMEFRAME:021973}.      {SLEEP ROS QUESTIONS:944710::\"CONSTITUTIONAL: NEGATIVE for weight gain/loss, fever, chills, sweats or night sweats, drug allergies.\",\"EYES: NEGATIVE for changes in vision, blind spots, double vision.\",\"ENT: NEGATIVE for ear pain, sore throat, sinus pain, post-nasal drip, runny nose, bloody nose\",\"CARDIAC: NEGATIVE for fast heartbeats or fluttering in chest, chest pain or pressure, breathlessness when lying flat, swollen legs or swollen feet.\",\"NEUROLOGIC: NEGATIVE headaches, weakness or numbness in the arms or legs.\",\"DERMATOLOGIC: NEGATIVE for rashes, new moles or change in mole(s)\",\"PULMONARY: NEGATIVE SOB at rest, SOB with activity, dry cough, productive cough, coughing up blood, wheezing or whistling when breathing.  \",\"GASTROINTESTINAL: NEGATIVE for nausea or vomitting, loose or watery stools, fat or grease in stools, constipation, abdominal pain, bowel movements black in color or blood noted.\",\"GENITOURINARY: NEGATIVE for pain during urination, blood in urine, urinating more frequently than usual, irregular menstrual periods.\",\"MUSCULOSKELETAL: NEGATIVE for muscle pain, bone or joint pain, swollen joints.\",\"ENDOCRINE: NEGATIVE for increased thirst or urination, diabetes.\",\"LYMPHATIC: NEGATIVE for swollen lymph nodes, lumps or bumps in the breasts or nipple discharge.\"}         Physical Examination: "     [place] Total Score 6/10/2020   Total score - [place] 12     Neck Circumference: *** inches/cm   Constitutional: . ***Awake, alert, cooperative, dressed casually, good eye contact, comfortably sitting in a chair, in no apparent distress  Mood: ***euthymic; affect congruent with full range and intensity.  Attention/Concentration:  Normal   Eyes: No icterus.  ENT: Mallampati Class: {BECCA NUMERAL I-IV:409162}.   Tonsillar Stage: {NUMBERS 1-4:307693}  *** Retrognathia, micrognathia, small and crowded oropharynx,  low-lying soft palate macroglossia, tonsillar hypertrophy, elongated uvula, turbinate hypertrophy, deviated nasal septum, poor dentition***  Cardiovascular: ***Regular S1 and S2, no gallops or murmurs. No carotid bruits  Neck: ***Supple, no thyroid enlargement.   Pulmonary:  ***Chest symmetric, lungs clear bilaterally and no crackles, wheezes or rales  Extremities:  ***No pedal edema.  Muscle/joint: ***Strength and tone normal   Skin:  ***No rash or significant lesions.   Gait ***Normal.  Neurologic: ***Alert, oriented x3, no focal neurological deficit, cranial nerves grossly normal                Allergies:    No Known Allergies    Medications:    Current Outpatient Medications   Medication Sig Dispense Refill     escitalopram (LEXAPRO) 20 MG tablet Take 1 tablet (20 mg) by mouth daily 90 tablet 1       Problem List:  Patient Active Problem List    Diagnosis Date Noted     Current moderate episode of major depressive disorder, unspecified whether recurrent (H) 10/15/2019     Priority: Medium     Menorrhagia with regular cycle 10/15/2019     Priority: Medium     Generalized anxiety disorder 08/27/2015     Priority: Medium     CARDIOVASCULAR SCREENING; LDL GOAL LESS THAN 160 01/13/2015     Priority: Medium     Exotropia 05/07/2007     Priority: Medium     Problem list name updated by automated process. 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    Neck Circumference: *** inches/cm   Constitutional: . ***Awake, alert, cooperative, dressed casually, good eye contact, comfortably sitting in a chair, in no apparent distress  Mood: ***euthymic; affect congruent with full range and intensity.  Attention/Concentration:  Normal   Eyes: No icterus.  ENT: Mallampati Class: {BECCA NUMERAL I-IV:727814}.   Tonsillar Stage: {NUMBERS 1-4:233893}  *** Retrognathia, micrognathia, small and crowded oropharynx,  low-lying soft palate macroglossia, tonsillar hypertrophy, elongated uvula, turbinate hypertrophy, deviated nasal septum, poor dentition***  Cardiovascular: ***Regular S1 and S2, no gallops or murmurs. No carotid bruits  Neck: ***Supple, no thyroid enlargement.   Pulmonary:  ***Chest symmetric, lungs clear bilaterally and no crackles, wheezes or rales  Extremities:  ***No pedal edema.  Muscle/joint: ***Strength and tone normal   Skin:  ***No rash or significant lesions.   Gait ***Normal.  Neurologic: ***Alert, oriented x3, no focal neurological deficit, cranial nerves grossly normal         [place] Total Score 6/19/2020   Total score - [place] 12           CC: Jackie Echeverria                                  A/p:            "

## 2020-06-22 ENCOUNTER — VIRTUAL VISIT (OUTPATIENT)
Dept: SLEEP MEDICINE | Facility: CLINIC | Age: 25
End: 2020-06-22
Attending: FAMILY MEDICINE
Payer: COMMERCIAL

## 2020-06-22 DIAGNOSIS — G25.81 RESTLESS LEGS SYNDROME (RLS): ICD-10-CM

## 2020-06-22 DIAGNOSIS — G47.21 CIRCADIAN RHYTHM SLEEP DISORDER, DELAYED SLEEP PHASE TYPE: Primary | ICD-10-CM

## 2020-06-22 DIAGNOSIS — R06.83 SNORING: ICD-10-CM

## 2020-06-22 PROCEDURE — 99205 OFFICE O/P NEW HI 60 MIN: CPT | Mod: 95 | Performed by: NURSE PRACTITIONER

## 2020-06-22 NOTE — PROGRESS NOTES
"Ness Alamo is a 24 year old female who is being evaluated via a billable video visit.      The patient has been notified of following:     \"This video visit will be conducted via a call between you and your physician/provider. We have found that certain health care needs can be provided without the need for an in-person physical exam.  This service lets us provide the care you need with a video conversation.  If a prescription is necessary we can send it directly to your pharmacy.  If lab work is needed we can place an order for that and you can then stop by our lab to have the test done at a later time.    Video visits are billed at different rates depending on your insurance coverage.  Please reach out to your insurance provider with any questions.    If during the course of the call the physician/provider feels a video visit is not appropriate, you will not be charged for this service.\"    Patient has given verbal consent for Video visit? Yes    Will anyone else be joining your video visit? No        Video-Visit Details    Type of service:  Video Visit    Video Start Time: 3pm3pm-difficulty with sound  Video End Time: 4pmswitch to doxLexara    Originating Location (pt. Location): Home    Distant Location (provider location):  [facility]     Platform used for Video Visit: [facility] and [employer] video    RITA Crowley CNP     Referral #  Creation Date  Referral Status  Status Update     [id number]  04/09/2020  Closed  04/10/2020: Status History    Status Reason  Referral Type  Referral Reasons  Referral Class    Complete  Consultation  none  Internal    To Specialty  To Provider  To Location/Place of Service  To Department    none  none  [facility]  none    To Vendor  Referred By  By Location/Place of Service  By Department    Jackie Giang,   [facility]    Priority  Start Date  Expiration Date  Referral Entered By  "   Routine  04/09/2020 04/09/2021  Jackie Echeverria DO    Visits Requested  Visits Authorized  Visits Completed  Visits Scheduled    1  1  1  0    Procedure Information     Procedure Details   Procedure  Modifiers  Provider  Requested  Approved    [id number] - SLEEP EVALUATION & MANAGEMENT REFERRAL - [facility] [phone] (Age 15 and up)  None   1  1    Diagnosis Information     Diagnosis    G47.10 (ICD-10-CM) - Excessive sleepiness    Referral Notes   Number of Notes: 1   Type  Date  User  Summary  Attachment    Provider Comments  04/09/2020  4:19 PM  Jackie Echeverria DO  Provider Comments  -    Note     Please be aware that coverage of these services is subject to the terms and limitations of your health insurance plan.  Call member services at your health plan with any benefit or coverage questions.       Please bring the following to your appointment:     >>   List of current medications   >>   This referral request   >>   Any documents/labs given to you for this referral                  Referral Order     Order    SLEEP EVALUATION & MANAGEMENT REFERRAL - [facility] [phone] (Age 15 and up) (Order # [id number]) on 04/09/2020    View Encounter       Communications   (0)        None      At her last visit, we decided to simplify her medications for her since she was often forgetting to take them.  We stopped Zoloft and Buspar and switched to Lexapro 20mg daily instead.  She feels like the Lexapro has been helpful.  She feels less sad.  Her friends have noticed that she's in a better mood.  She still feels like she's sleeping a lot.  She's having trouble staying asleep, but no trouble falling asleep.  She usually sleeps for about 9 hours and then feels like she needs to nap within 4 hours after waking up.  Her girlfriend is concerned that she might have sleep apnea.  She says it sounds like she's choking in her sleep.  Her dentist has been  concerned about this in the past as well.      [facility] Center   Outpatient Sleep Medicine Consultation  6/22/2020        Name: Ness Alamo MRN# [id number]   Age: 24 YOB: 1995      Date of Consultation: 4/9/2020  Consultation is requested by: Jackie Echeverria DO        Reason for Sleep Consult:      Excessive sleepiness          Assessment and Plan:    Ness reports that a several year history of difficulty with staying asleep, then taking long naps to make up for poor quality of sleep.   This has greatly impacted quality of life, denies difficulty with driving but does identify an impact on her mental health with anxiety and depression.    SCALES       SLEEP APNEA: Stopbang score 2-3/8       INSOMNIA:  Insomnia severity score: 15/28       SLEEPINESS: Holcomb sleepiness scale: 12/24       RICHELLE 7:      It's my impression that Ness has a mild pretest probability of yesy with symptoms of snoring, overweight, and waking up choking, in the setting of DSPD with and irregular sleep schedule.  And the following diagnosis and plan has been developed...    Summary Sleep Diagnoses:      Snoring : possibly 2-3/8 stop bang, will address circadian rhythm first    Delayed sleep phase disorder: phase aligh    RLS: labs gabapentin, lifestyle    Overweight: bmi 29, elevated with  ethnicity      Summary Recommendations:        Sleep diaries and follow up to response of tx for rls and cementing circadian rhythm in 4 wks.  May consider overnight oximetry or HST to screen for YESY.    Treatment therapies and final decision with regard for need for apnea test with virtual visit with diaries     Summary Counseling:  See instructions     Counseling included a comprehensive review of biology of DSPD: light, and natural onset of melatonin with darkness (except with device use) and ofset with regular AM light.  The regularity of biological rythyms neurotoxin cleansing, GI cleansing,Cortisol release; along with Rls associated with SSRIs,  low ferritin, caffeine and benadryl. Cessation of sleep habits alcohol before bedtime avoidance of more than 3 caffeinated beverages       was provided with educational materials in instructions which were reviewed with this visit.                History of Present Illness:        Ness reports since teen, she's been exceptionally sleepy, reports being a nightowl since late middle school, and it worsened with college    (snoing, snorting, gasping, choking,  waking up with panic or inc in HR infrequent , sleeping separately, sleep better if elevated or in chair, unable to lie flat and breathe with sleeping)      SLEEP COMPLAINTS: DENIES THE FOLLOWING: (unless indicated in RED):  Signs/symptoms:    Morning headaches or confusion-  GERD or aspiration:yes all the time  Pain   Bruxism: yes  Parasomnia:  sw  : confusional arousals    Narcolepsy:  Cataplexy , sleep paralysis  , hallucinations    RLS: : yes: 1month on lexapro, caffeine, advil pm, heavy menstural cycles      SLEEP-WAKE SCHEDULE: free start time  Enters bed on [id number], exits bed on weekdays 8-10A; enters bed at weekends never leaves bed ( exits bed on weekends 9-(12-1PM sleep)  SLEEP LATENCY  60 min(s)  and one awakening for bathroom 2:30 A with 2 hrs mins to return to sleep 2/7, turns to  Devices then.  Sleep schedule is irregular   Work schedule is  Open-very flexible about arrival, strives for 9:30-4PM  Estimated total sleep time 8 + naps  Naps:  Time of day off (if off, may go back at 11A-1P)  refall at 5-7pm  Or after work 2-4pm, length 2 hr or 1 -3 hr, frequency 7/7   Activities in bed:  Devices, phone computer    Denies the following unless in RED:   Behaviors in bed:  clock watching hx of,   to do list if not on phone, worry about health or ability to complete next day's tasks if not on phone   Housework occasional, anxiety or rumination,stop anxiety waking up with heart pounding or racing some times               Cardio-respiratory    Coexisting  "Lung disease:  2 flights without stopping, no exercise program           Coexisting Heart disease:  no             Social, personal, family History and use of sleep aids or substances that affect sleep:     andrew lives with roommate/bedparntner.  Employed with a + start 9-11 A. Sleep is disrupted by environmental factors from noise +pets +, bedpartner (dif sleep schedule)   Has bed partner been sleeping separately because of snoring or:  no    Sleep Family Hx:         Snoring  +        RLS-    YESY - uncle+  Insomnia -   Circadian rhythm: dspd  Parasomnia -   Chemical History:      Tobacco: no      Uses 3 cups/day of coffee. Last caffeine intake is usually before 3    Supplements for wakefulness: -    EtOH: 1 x/wk   Recreational Drugs: -   Sleep aids: yes:advil pm  Melatonin in past ? mg         Review of Systems:     A complete 10 point review of systems was negative other than HPI or as commented below If bold   has gained 10 pounds past  /year      {SLEEP ROS QUESTIONS:[id number]::\"CONSTITUTIONAL: NEGATIVE for weight gain/loss, fever, chills, sweats or night sweats, drug allergies.\",\"EYES: NEGATIVE for changes in vision, blind spots, double vision.\",\"ENT: NEGATIVE for ear pain, sore throat, sinus pain, post-nasal drip, runny nose, bloody nose\",\"CARDIAC: NEGATIVE for fast heartbeats or fluttering in chest, chest pain or pressure, breathlessness when lying flat, swollen legs or swollen feet.\",\"NEUROLOGIC: NEGATIVE headaches, weakness or numbness in the arms or legs.\",\"PULMONARY: NEGATIVE SOB at rest, SOB with activity-can do 2 flights stairs without stopping, dry cough, productive cough, coughing up blood, wheezing or whistling when breathing.  \",\"GASTROINTESTINAL: NEGATIVE for discomfort at night,\"GENITOURINARY: NEGATIVE for pain during urination, blood in urine, urinating more frequently than usual, Heavy menstrual periods.\",\"MUSCULOSKELETAL: NEGATIVE for muscle pain, bone or joint pain, swollen joints.\",\"ENDOCRINE: " "NEGATIVE for increased thirst or urination, diabetes.\",  MH: depression/anxiety         Physical Examination:     [place] Total Score 6/10/2020   Total score - Bronx 12   COLUMBA 15/28  Physical Examination:  Vitals: Ht 1.613 m (5' 3.5\")   Wt 77.1 kg (170 lb)   BMI 29.64 kg/m    BMI= Body mass index is 29.64 kg/m .     Constitutional: . Awake, alert, cooperative, dressed casually, good eye contact, comfortably sitting in a chair, in no apparent distress  Mood: euthymic; affect congruent with full range and intensity.  Attention/Concentration:  Normal   Eyes: No icterus.  ENT: Nares mildly congested  Mallampati Class: IV.  low-lying soft palate macroglossia,   Pulmonary:  Respiratory rate regular,   Extremities:  No clubbing bilateral upper extremities  Neurologic: Alert, oriented x3, no focal neurological deficit,     Allergies:    No Known Allergies    Medications:    Current Outpatient Medications   Medication Sig Dispense Refill     escitalopram (LEXAPRO) 20 MG tablet Take 1 tablet (20 mg) by mouth daily 90 tablet 1       Problem List:  Patient Active Problem List    Diagnosis Date Noted     Current moderate episode of major depressive disorder, unspecified whether recurrent (H) 10/15/2019     Priority: Medium     Menorrhagia with regular cycle 10/15/2019     Priority: Medium     Generalized anxiety disorder 08/27/2015     Priority: Medium     CARDIOVASCULAR SCREENING; LDL GOAL LESS THAN 160 01/13/2015     Priority: Medium     Exotropia 05/07/2007     Priority: Medium     Problem list name updated by automated process. 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 Although reviewed after completion, some word and grammatical error " may remain . Please contact the author for any clarifications.    CC: Jackie Echeverria,

## 2020-06-24 RX ORDER — GABAPENTIN 100 MG/1
CAPSULE ORAL
Qty: 90 CAPSULE | Refills: 1 | Status: SHIPPED | OUTPATIENT
Start: 2020-06-24 | End: 2021-03-29

## 2020-06-25 NOTE — PATIENT INSTRUCTIONS
"The following is recommended:    Fasting ferritin and TIBC-you can get this at a [facility] or lab     treatments that can work well is developing a routine of stretching legs, warm bath, massage of legs with lotion, all before bedtime.  Avoiding excessive caffeine, and alcohol within 3-4 hrs of bedtime can help as well.    Gabapentin 100mg pill, 1.5 hrs before bedtime (when you stop using your devices) . 1 pill for 3-5 days, if still feeling RLS increase to 2 pills, then after 3-5 days can increase to 3 pills if needed. If you needed to go to 3 pills, then stay     No driving after taking gabapentin-as it will make you sleepy, especially at first.        Circadian rhythm: DSPD: protect am sleep time, then get light when alarm goes off \"open curtains, check your phone, walk dog\".    Ideas to keep sleep restorative: unwind outside of bed,   devices off 60 mins prior to bedtime  (books puzzles, tv across the room) anticipated bedtime about 12-1A,   keep rise time the same 9A during week maybe 10A,   power naps if needed: < 45 mins and early    Follow up via video virtual visit in 4 wks, scan diaries in to me to be available at 4 wk appt.      Sent via my chart  "

## 2020-11-15 ENCOUNTER — TELEPHONE (OUTPATIENT)
Dept: FAMILY MEDICINE | Facility: CLINIC | Age: 25
End: 2020-11-15

## 2020-11-15 DIAGNOSIS — F32.1 CURRENT MODERATE EPISODE OF MAJOR DEPRESSIVE DISORDER, UNSPECIFIED WHETHER RECURRENT (H): ICD-10-CM

## 2020-11-15 DIAGNOSIS — F41.1 GENERALIZED ANXIETY DISORDER: ICD-10-CM

## 2020-11-17 RX ORDER — ESCITALOPRAM OXALATE 20 MG/1
TABLET ORAL
Qty: 90 TABLET | Refills: 0 | Status: SHIPPED | OUTPATIENT
Start: 2020-11-17 | End: 2020-12-30

## 2020-11-17 NOTE — TELEPHONE ENCOUNTER
Routing refill request to provider for review/approval because:  out of range:  phq9 >4  Due for appt

## 2020-12-30 ENCOUNTER — OFFICE VISIT (OUTPATIENT)
Dept: FAMILY MEDICINE | Facility: CLINIC | Age: 25
End: 2020-12-30
Payer: COMMERCIAL

## 2020-12-30 VITALS
RESPIRATION RATE: 12 BRPM | SYSTOLIC BLOOD PRESSURE: 107 MMHG | DIASTOLIC BLOOD PRESSURE: 73 MMHG | WEIGHT: 164.4 LBS | HEART RATE: 88 BPM | TEMPERATURE: 98.1 F | BODY MASS INDEX: 28.07 KG/M2 | HEIGHT: 64 IN | OXYGEN SATURATION: 100 %

## 2020-12-30 DIAGNOSIS — F32.1 CURRENT MODERATE EPISODE OF MAJOR DEPRESSIVE DISORDER, UNSPECIFIED WHETHER RECURRENT (H): ICD-10-CM

## 2020-12-30 DIAGNOSIS — G47.01 INSOMNIA DUE TO MEDICAL CONDITION: ICD-10-CM

## 2020-12-30 DIAGNOSIS — F41.1 GENERALIZED ANXIETY DISORDER: ICD-10-CM

## 2020-12-30 DIAGNOSIS — N92.0 MENORRHAGIA WITH REGULAR CYCLE: Primary | ICD-10-CM

## 2020-12-30 PROCEDURE — 99214 OFFICE O/P EST MOD 30 MIN: CPT | Mod: GC | Performed by: STUDENT IN AN ORGANIZED HEALTH CARE EDUCATION/TRAINING PROGRAM

## 2020-12-30 RX ORDER — HYDROXYZINE HYDROCHLORIDE 25 MG/1
25 TABLET, FILM COATED ORAL AT BEDTIME
Qty: 60 TABLET | Refills: 0 | Status: SHIPPED | OUTPATIENT
Start: 2020-12-30 | End: 2021-03-08

## 2020-12-30 RX ORDER — ESCITALOPRAM OXALATE 20 MG/1
20 TABLET ORAL DAILY
Qty: 90 TABLET | Refills: 0 | Status: SHIPPED | OUTPATIENT
Start: 2020-12-30 | End: 2021-04-21

## 2020-12-30 RX ORDER — LEVONORGESTREL/ETHIN.ESTRADIOL 0.1-0.02MG
1 TABLET ORAL DAILY
Qty: 90 TABLET | Refills: 3 | Status: SHIPPED | OUTPATIENT
Start: 2020-12-30 | End: 2021-03-29

## 2020-12-30 ASSESSMENT — ANXIETY QUESTIONNAIRES
7. FEELING AFRAID AS IF SOMETHING AWFUL MIGHT HAPPEN: NOT AT ALL
6. BECOMING EASILY ANNOYED OR IRRITABLE: NOT AT ALL
1. FEELING NERVOUS, ANXIOUS, OR ON EDGE: NOT AT ALL
GAD7 TOTAL SCORE: 3
3. WORRYING TOO MUCH ABOUT DIFFERENT THINGS: NOT AT ALL
5. BEING SO RESTLESS THAT IT IS HARD TO SIT STILL: NOT AT ALL
2. NOT BEING ABLE TO STOP OR CONTROL WORRYING: NOT AT ALL
IF YOU CHECKED OFF ANY PROBLEMS ON THIS QUESTIONNAIRE, HOW DIFFICULT HAVE THESE PROBLEMS MADE IT FOR YOU TO DO YOUR WORK, TAKE CARE OF THINGS AT HOME, OR GET ALONG WITH OTHER PEOPLE: NOT DIFFICULT AT ALL

## 2020-12-30 ASSESSMENT — PATIENT HEALTH QUESTIONNAIRE - PHQ9
5. POOR APPETITE OR OVEREATING: NEARLY EVERY DAY
SUM OF ALL RESPONSES TO PHQ QUESTIONS 1-9: 14

## 2020-12-30 ASSESSMENT — MIFFLIN-ST. JEOR: SCORE: 1470.71

## 2020-12-30 NOTE — PATIENT INSTRUCTIONS
1. Menorrhagia with regular cycle  - levonorgestrel-ethinyl estradiol (AVIANE) 0.1-20 MG-MCG tablet; Take 1 tablet by mouth daily  Dispense: 90 tablet; Refill: 3    2. Generalized anxiety disorder  3. Current moderate episode of major depressive disorder, unspecified whether recurrent (H)  - escitalopram (LEXAPRO) 20 MG tablet; Take 1 tablet (20 mg) by mouth daily  Dispense: 90 tablet; Refill: 0  -Mental health referral placed.     4. Insomnia due to medical condition  Try taking hydroxyzine at night. Can take 1-2 tablets.   - hydrOXYzine (ATARAX) 25 MG tablet; Take 1 tablet (25 mg) by mouth At Bedtime  Dispense: 60 tablet; Refill: 0      Follow up as needed.     Take careDr. Andres    12/31/20   MENTAL HEALTH REFERRAL    Mental Health referral routed to behavioral health team for recommendations. See Documentation Only encounter for more information.    Mariama Howe

## 2020-12-30 NOTE — PROGRESS NOTES
Preceptor Attestation:    Patient seen and evaluated in person. I discussed the patient with the resident. I have verified the content of the note, which accurately reflects my assessment of the patient and the plan of care.   Supervising Physician:  Luis Awad MD.

## 2020-12-30 NOTE — PROGRESS NOTES
ASSESSMENT AND PLAN     25 year old female with a past medical history of menorrhagia and MDD/RICHELLE who presents today to clinic to establish care and a medication refill.     1. Menorrhagia with regular cycle  History of heavy menstrual cycle, occurs monthly, lasting 4-6 days. Soaks through multiple pads and associated with cramping abdominal pain. Will start with combined birth control pills to help regulate periods. Discussed that can follow up if not improving in the next month or two.   - levonorgestrel-ethinyl estradiol (AVIANE) 0.1-20 MG-MCG tablet; Take 1 tablet by mouth daily  Dispense: 90 tablet; Refill: 3    2. Generalized anxiety disorder  3. Current moderate episode of major depressive disorder, unspecified whether recurrent (H)  Currently on Lexapro 20mg daily. No suicidal/homicidal ideation or thoughts to harm self. Feels like depression/anxiety is well controlled right now. Interested in establishing care with mental health team here at clinic. PHQ-9 is 14 here today.   - escitalopram (LEXAPRO) 20 MG tablet; Take 1 tablet (20 mg) by mouth daily  Dispense: 90 tablet; Refill: 0  - MENTAL HEALTH REFERRAL  -    4. Insomnia due to medical condition  Having trouble sleep for a long period of time. Was on gabapentin which was not helping. Willing to try hydroxyzine.   - hydrOXYzine (ATARAX) 25 MG tablet; Take 1 tablet (25 mg) by mouth At Bedtime  Dispense: 60 tablet; Refill: 0    RTC as needed for follow up or sooner if develops new or worsening symptoms.    The patient was seen by, and discussed with, Dr. Luis Awad, who agrees with the plan.    Nell Andres MD PGY3  [facility]         SUBJECTIVE       Ness Alamo is a 25 year old  female with a PMH significant for:     Patient Active Problem List   Diagnosis     Exotropia     CARDIOVASCULAR SCREENING; LDL GOAL LESS THAN 160     Generalized anxiety disorder     Current moderate episode of major depressive disorder, unspecified whether  "recurrent (H)     Menorrhagia with regular cycle     She presents for a medication check and to establish care.    Recently, was being seen by Dr. Echeverria, who had patient on lexapro 20mg daily. Feels like depression/anxiety is well controlled. Denies suicidal/homicidal ideation. Has been dealing with this since she was in high school. Has thoughts of hopelessness sometimes.     Has a history of heavy menstrual cycles. Happens sometimes in the month, lasting 4-6 days, usually goes through like 2 pads per night and 2-3 during the day. Lower abdominal cramping. Sleeps with towel underneath and leaks. Menarche was 13-14 years old. Used to take some pills. LMP was 12/21.       Takes gabapentin at night. Fell asleep at 2am, then woke up at 5am. Sleep has always been an issue. Has trouble getting to sleep and stays asleep longer than she wants to.    Social:Lives with girlfriend. 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 No smoking. Drinks alcohol occasionally.     PMH, Medications and Allergies were reviewed and updated as needed.        REVIEW OF SYSTEMS     ROS reviewed in HPI.         OBJECTIVE     Vitals:    12/30/20 1139   BP: 107/73   BP Location: Left arm   Patient Position: Sitting   Cuff Size: Adult Regular   Pulse: 88   Resp: 12   Temp: 98.1  F (36.7  C)   TempSrc: Oral   SpO2: 100%   Weight: 74.6 kg (164 lb 6.4 oz)   Height: 1.626 m (5' 4\")     Body mass index is 28.22 kg/m .    General: Alert, well appearing, no acute distress  Eyes: PERRL, EOMI, normal conjunctiva  HENT: Normocephalic, atraumatic; moist mucous membranes, no oropharyngeal erythema  Neck: No tenderness, no lymphadenopathy  Lungs: Clear to auscultation bilaterally, no wheezing, no rhonchi, no crackles  CV: Regular rate and rhythm, no murmur, no rubs, no gallops  Abdomen: Soft, nontender, non-distended, no masses palpated, normal bowel sounds  Extremities: Able to move all extremities, nontender, normal strength  Skin: Dry, no cyanosis, no abrasions, no " discoloration.    No results found for this or any previous visit (from the past 24 hour(s)).

## 2020-12-31 ENCOUNTER — DOCUMENTATION ONLY (OUTPATIENT)
Dept: PSYCHOLOGY | Facility: CLINIC | Age: 25
End: 2020-12-31

## 2020-12-31 ASSESSMENT — ANXIETY QUESTIONNAIRES: GAD7 TOTAL SCORE: 3

## 2020-12-31 NOTE — PROGRESS NOTES
Behavioral Health Team,    Patient is being referred for mental health services by their provider, Dr. Andres.  Please advise if we are able to see patient for in house treatment or if a community option would be best.    Thank you,    Mariama Howe  12/31/2020

## 2020-12-31 NOTE — PROGRESS NOTES
Review of Dr. Schwab's order and note indicates that this is a referral for psychotherapy for anxiety and depression. Patient has a history of Generalized Anxiety Disorder and Major Depressive Disorder and currently prescribed hydroxyzine and escitalopram managed for mood management and insomnia by Dr. Andres. Brief review of record indicated patent has not been seen in this clinic for behavioral health services. No SI/HI/self harm related concerns. Review of Dr. Cline and Dr. Beckwith's schedules show openings as early as 1/11 and 1/13. Will forward this order to Referral Coordinator to contact patient for scheduling. If for some reason patient is not able to be scheduled with either provider, the following community resources can be provided.    [facility]  [address]  [place], [place] [address]  [phone]  COVID-19 Update 03-: Due to the COVID-19 Virus, [facility] is offering only HIPPA secure Telehealth care at this time. In order to revive care, you ll need to call the clinic at [phone] and provide your email to clinic support staff.    [facility]  [address]  [place] [address]  [phone]  COVID-19 Update 3-:  [facility] and Urgent care are both operating but screening for symptoms prior to seeing anyone in person.   Only providing psychiatry services to uninsured patients at this time.    [facility]  [address]   [address]  [place] [address]  [phone] (for appointments)  Fax: [phone]  COVID-19 Update 3-: [facility] at both [facility] are taking new patients but doing all visits by telephone or video. See this website for up to date changes: [email or web address]    If patient will be seen by in-house  provider, will ask our referral team to screen for telehealth barriers at time of follow up so that we can  "identify who may benefit from use of telehealth hub at [facility]. Help for patients who will be scheduled in the community, but need telehealth support will be managed on a case by case basis.    Do you have access to a computer with a webcam or smartphone?   Do you have access to the internet?   Do you have a safe and private space for this conversation?     If the patient answers \"no\" to any of these questions, referral team will engage patient in conversation about whether patient would like to access services via [facility]'s telehealth hub.  This would ensure access while mitigating risk by limiting time in face to face contact with provider (safer if under 15 minutes of exposure in close space, etc.). If so, this will need to be coordinated and documented on [facility] calendar.    Let me know if you have questions or would like additional follow up from me. Thanks!      Denia Cline, Ben.[email or web address].  Primary Care Behavioral Health Fellow    Disclaimer  The above treatment recommendations are based on consultation with the patient's primary care provider and a review of relevant information in EPIC. I have not personally examined the patient. All recommendations should be implemented with considerations of the patient's relevant prior history and current clinical status. Please contact me with any questions about the care of this patient.  "

## 2021-01-04 NOTE — PROGRESS NOTES
I have reviewed and agree with the behavioral health fellow's summary and recommendations.  Nancy Bradford, PhD., LP

## 2021-01-06 NOTE — PROGRESS NOTES
01/06/21- first attempt to contact patient. Left brief vm for a return call. Will try again within 1 week.     Mariama Howe

## 2021-01-12 NOTE — PROGRESS NOTES
Patient scheduled:  Video Visit  Appointment: Monday February 1st  Arrival Time: 3:00  Provider: Dr. Cline

## 2021-01-15 ENCOUNTER — HEALTH MAINTENANCE LETTER (OUTPATIENT)
Age: 26
End: 2021-01-15

## 2021-01-28 ENCOUNTER — TELEPHONE (OUTPATIENT)
Dept: PSYCHOLOGY | Facility: CLINIC | Age: 26
End: 2021-01-28

## 2021-01-28 NOTE — TELEPHONE ENCOUNTER
Attempted to contact patient Ness Alamo, who is scheduled for Telephone Individual Psychotherapy appointment with this provider 2/1/2012 @ 3 PM. Due to a change in provider schedule, this appointment will need to be rescheduled.     Attempted to contact patient by phone number recorded in medical record at 8:22 AM. Patient did not answer.     Left a voicemail message with the reason for call requesting the patient call the [facility]  at [phone] to re-schedule the appointment should she be interested.      Plan:   Primary Care/Referring Provider will be alerted to this note for information only.     This message will be forwarded to  staff for assistance contacting patient. This provider will make an additional call tomorrow morning if they are unable to reach patient for scheduling.     Denia Cline Psy.D.  they/them/theirs  Primary Care Behavioral Health Fellow

## 2021-01-29 NOTE — TELEPHONE ENCOUNTER
Contacted patient Ness Alamo by phone to reschedule an appointment with this provider scheduled 2/1/2012 @ 3 PM due to provider no longer being available for that time.     Spoke briefly with patient who elected to reschedule with this provider 2/8/2021 @ 10 AM. Ness denied having any acute mental health concerns or questions at the time of the call.    PLAN:  This provider contacted the  and patient was rescheduled for 2/8/2021 @ 10 AM via video.    PCP/Referring provider will be alerted to this note for information only.    Denia Cline, Psharry.GARCIA.  they/them/theirs  Primary Care Behavioral Health Fellow

## 2021-02-08 ENCOUNTER — VIRTUAL VISIT (OUTPATIENT)
Dept: PSYCHOLOGY | Facility: CLINIC | Age: 26
End: 2021-02-08
Payer: COMMERCIAL

## 2021-02-08 DIAGNOSIS — R69 PSYCHIATRIC DIAGNOSIS DEFERRED: Primary | ICD-10-CM

## 2021-02-08 NOTE — PROGRESS NOTES
Placed a call to patient Ness Alamo after being unable to reach her for scheduled Video/Telehealth Individual Psychotherapy appointment at 10 AM on 2/8/2021.     Sent email and text invite to virtual meeting at 10:01 AM and 10:08 AM. Attempted to contact patient by phone number recorded in medical record at 10:08 AM and 10:12 AM. Patient did not answer.    Left a voicemail message requesting the patient call the [facility]  at [phone] to re-schedule the appointment should she be interested.     Plan:   Primary Care/Referring Provider will be alerted to this note for information only.    One additional outreach call will be placed by this provider to reschedule this appointment. If that attempt is unsuccessful, a letter will be mailed to the address on file. Following that letter, no additional outreach attempts will be made unless this provider is contacted by another referring provider.     Denia Cline, Psharry.GARCIA.  they/them/theirs  Primary Care Behavioral Health Fellow

## 2021-03-04 ENCOUNTER — TELEPHONE (OUTPATIENT)
Dept: PSYCHOLOGY | Facility: CLINIC | Age: 26
End: 2021-03-04
Payer: COMMERCIAL

## 2021-03-04 DIAGNOSIS — R69 PSYCHIATRIC DIAGNOSIS DEFERRED: Primary | ICD-10-CM

## 2021-03-04 NOTE — TELEPHONE ENCOUNTER
Attempted to contact Patient Ness Alamo, preferred name Ness, for outreach as we were unable to connect for Video/Telehealth Psychodiagnostic Assessment/Behavioral Health Intake appointment with this provider scheduled 2/8/2021 at 10 AM.     Attempted to contact patient by phone number recorded in medical record at 10:00 AM. Patient did not answer.    Left a voicemail message requesting the patient call the [facility]  at [phone] to re-schedule the appointment should she be interested.     Plan:   Primary Care/Referring Provider will be alerted to this note for information only.    A letter will be mailed to the address on file. Following that letter, no additional outreach attempts will be made unless this provider is contacted by another referring provider.     Denia Cline, Opal.  they/them/theirs  Primary Care Behavioral Health Fellow

## 2021-03-04 NOTE — LETTER
March 4, 2021      Ness Alamo  [address]  [place] [address]      Dear Ness,    I hope that this letter finds you well. I am writing to you today as we had a Video/Telehealth, Psychodiagnostic Assessment/Behavioral Health Intake appointment scheduled 2/8/2021 at 10 AM that we were not able to meet for. During previous visits in our clinic, you have discussed interest in behavioral health services with your primary care provider who made the referral.    I would be pleased to offer or continue these services with you. However, we recognize that individual needs may change at different times and many different factors can influence your interest or desire to receive behavioral health services. Should you determine that you are ready to participate in behavioral health services, I would encourage you to reach out me or your primary care provider to discuss your needs. I look forward to meeting you and wish you the best of luck in the future.    If you have any questions for or would like to schedule this appointment you can do so through the [facility]  at [phone].     Sincerely,        Denia Cline Psy.D.  they/them/theirs  Primary Care Behavioral Health Fellow

## 2021-03-04 NOTE — LETTER
March 4, 2021       TO: Ness Alamo  [address]  [place] [address]       Dear Ness,    I hope that this letter finds you well. I am writing to you today as we were scheduled for a Video/Telehealth appointment on 2/8/2021 at 10 AM. I was concerned when we could not reach you. I have tried to reach you by phone several times without success which I why I am sending a letter.    I would be pleased to offer these services to you. However, we recognize that individual needs change and many different factors can influence your interest or desire to receive behavioral health services. I would like to make an offer to help address any barriers that made attending this appointment difficult. We encourage folks to reach out on this so we can discuss over phone even if they are not ready to schedule at this time. You are also more than welcome to discuss any concerns with your primary care provider should that be more comfortable.    If you have any questions for or would like to schedule this appointment you can do so through the [facility]  at [phone].     Sincerely,        Denia Cline Psy.D.  they/them/theirs  Primary Care Behavioral Health Fellow

## 2021-03-29 ENCOUNTER — OFFICE VISIT (OUTPATIENT)
Dept: FAMILY MEDICINE | Facility: CLINIC | Age: 26
End: 2021-03-29
Payer: COMMERCIAL

## 2021-03-29 VITALS
TEMPERATURE: 98.5 F | DIASTOLIC BLOOD PRESSURE: 68 MMHG | HEART RATE: 73 BPM | WEIGHT: 168.6 LBS | BODY MASS INDEX: 28.94 KG/M2 | RESPIRATION RATE: 16 BRPM | SYSTOLIC BLOOD PRESSURE: 104 MMHG | OXYGEN SATURATION: 95 %

## 2021-03-29 DIAGNOSIS — Z23 NEED FOR PROPHYLACTIC VACCINATION AND INOCULATION AGAINST INFLUENZA: ICD-10-CM

## 2021-03-29 DIAGNOSIS — F32.1 CURRENT MODERATE EPISODE OF MAJOR DEPRESSIVE DISORDER, UNSPECIFIED WHETHER RECURRENT (H): ICD-10-CM

## 2021-03-29 DIAGNOSIS — M53.3 PAIN IN THE COCCYX: ICD-10-CM

## 2021-03-29 DIAGNOSIS — Z00.00 ROUTINE GENERAL MEDICAL EXAMINATION AT A HEALTH CARE FACILITY: Primary | ICD-10-CM

## 2021-03-29 DIAGNOSIS — N94.6 DYSMENORRHEA: ICD-10-CM

## 2021-03-29 DIAGNOSIS — G47.00 INSOMNIA, UNSPECIFIED TYPE: ICD-10-CM

## 2021-03-29 LAB
HBA1C MFR BLD: 5.8 % (ref 4.1–5.7)
HIV 1+2 AB+HIV1 P24 AG SERPL QL IA: NEGATIVE

## 2021-03-29 PROCEDURE — 90471 IMMUNIZATION ADMIN: CPT | Performed by: STUDENT IN AN ORGANIZED HEALTH CARE EDUCATION/TRAINING PROGRAM

## 2021-03-29 PROCEDURE — 36415 COLL VENOUS BLD VENIPUNCTURE: CPT | Performed by: STUDENT IN AN ORGANIZED HEALTH CARE EDUCATION/TRAINING PROGRAM

## 2021-03-29 PROCEDURE — 90651 9VHPV VACCINE 2/3 DOSE IM: CPT | Performed by: STUDENT IN AN ORGANIZED HEALTH CARE EDUCATION/TRAINING PROGRAM

## 2021-03-29 PROCEDURE — 90686 IIV4 VACC NO PRSV 0.5 ML IM: CPT | Performed by: STUDENT IN AN ORGANIZED HEALTH CARE EDUCATION/TRAINING PROGRAM

## 2021-03-29 PROCEDURE — 90746 HEPB VACCINE 3 DOSE ADULT IM: CPT | Performed by: STUDENT IN AN ORGANIZED HEALTH CARE EDUCATION/TRAINING PROGRAM

## 2021-03-29 PROCEDURE — 99395 PREV VISIT EST AGE 18-39: CPT | Mod: 25 | Performed by: STUDENT IN AN ORGANIZED HEALTH CARE EDUCATION/TRAINING PROGRAM

## 2021-03-29 PROCEDURE — 90472 IMMUNIZATION ADMIN EACH ADD: CPT | Performed by: STUDENT IN AN ORGANIZED HEALTH CARE EDUCATION/TRAINING PROGRAM

## 2021-03-29 PROCEDURE — 83036 HEMOGLOBIN GLYCOSYLATED A1C: CPT | Performed by: STUDENT IN AN ORGANIZED HEALTH CARE EDUCATION/TRAINING PROGRAM

## 2021-03-29 RX ORDER — TRAZODONE HYDROCHLORIDE 50 MG/1
50 TABLET, FILM COATED ORAL AT BEDTIME
Qty: 30 TABLET | Refills: 1 | Status: SHIPPED | OUTPATIENT
Start: 2021-03-29 | End: 2021-08-11

## 2021-03-29 RX ORDER — IBUPROFEN 200 MG
TABLET ORAL
Qty: 100 TABLET | Refills: 1 | Status: SHIPPED | OUTPATIENT
Start: 2021-03-29 | End: 2023-12-26

## 2021-03-29 ASSESSMENT — PATIENT HEALTH QUESTIONNAIRE - PHQ9: SUM OF ALL RESPONSES TO PHQ QUESTIONS 1-9: 20

## 2021-03-29 NOTE — PATIENT INSTRUCTIONS
"  Preventive Health Recommendations  Female Ages 26 - 39  Yearly exam:   See your health care provider every year in order to    Review health changes.     Discuss preventive care.      Review your medicines if you your doctor has prescribed any.    Until age 30: Get a Pap test every three years (more often if you have had an abnormal result).    After age 30: Talk to your doctor about whether you should have a Pap test every 3 years or have a Pap test with HPV screening every 5 years.   You do not need a Pap test if your uterus was removed (hysterectomy) and you have not had cancer.  You should be tested each year for STDs (sexually transmitted diseases), if you're at risk.   Talk to your provider about how often to have your cholesterol checked.  If you are at risk for diabetes, you should have a diabetes test (fasting glucose).  Shots: Get a flu shot each year. Get a tetanus shot every 10 years.   Nutrition:     Eat at least 5 servings of fruits and vegetables each day.    Eat whole-grain bread, whole-wheat pasta and brown rice instead of white grains and rice.    Get adequate Calcium and Vitamin D.     Lifestyle    Exercise at least 150 minutes a week (30 minutes a day, 5 days of the week). This will help you control your weight and prevent disease.    Limit alcohol to one drink per day.    No smoking.     Wear sunscreen to prevent skin cancer.    See your dentist every six months for an exam and cleaning.  Patient Education   Tips for Sleep Hygiene  \"Sleep hygiene\" means having good sleep habits.Follow these tips to sleep better at night:     Get on a schedule. Go to bed and get up at about the same time every day.    Listen to your body. Only try to sleep when you actually feel tired or sleepy.    Be patient. If you haven't been able to get to sleep after about 30 minutes or more, get up and do something calming or boring until you feel sleepy. Then return to bed and try again.    Don't have caffeine (coffee, " "tea, cola drinks, chocolate and some medicines), alcohol or nicotine (cigarettes). These can make it harder for you to fall asleep and stay asleep.    Use your bed for sleeping only. That means no TV, computer or homework in bed, especially during the evening and before bedtime.    Don't nap during the day. If you must nap, make sure it is for less than 20 minutes.    Create sleep rituals that remind your body it is time to sleep. Examples include breathing exercises, stretching or reading a book.    Avoid all electronic media (smart phone, computer, tablet) within 2 hours of bed time. The \"blue light\" in these devices activates the part of the brain that keeps you awake.    Dim the lights at night.    Get early morning sources of light (walk in the sunshine) to help set sleep patterns at night.    Try a bath or shower before bed. Having a warm bath 1 to 2 hours before bedtime can help you feel sleepy. Hot baths can make you alert, so be mindful of the temperature.    Don't watch the clock. Checking the clock during the night can wake you up. It can also lead to negative thoughts such as, \"I will never fall asleep,\" which can increase anxiety and sleeplessness.    Use a sleep diary. Track your sleep schedule to know your sleep patterns and to see where you can improve.    Get regular exercise every day. Try not to do heavy exercise in the 4 hours before bedtime.    Eat a healthy, balanced diet.    Try eating a light, healthy snack before bed, but avoid eating a heavy meal.    Create the right sleeping area. A cool, dark, quiet room is best. If needed, try earplugs, fans and blackout curtains.    Keep your daytime routine the same even if you have a bad night sleep. Avoiding activities the next day can make it harder to sleep.  For informational purposes only. Not to replace the advice of your health care provider.   Copyright   2013 [employer]. All rights reserved. [employer] 665888 - 01/16.       "

## 2021-03-29 NOTE — PROGRESS NOTES
Preceptor Attestation:    I discussed the patient with the resident and evaluated the patient in person. I have verified the content of the note, which accurately reflects my assessment of the patient and the plan of care.   Supervising Physician:  Gilberto Archibald MD.

## 2021-03-29 NOTE — PROGRESS NOTES
Female Physical Note    Concerns today:   -Insomnia: Sleeps from 3 to 4 AM until 11 AM to 1 PM.  Does not sleep through the night, wakes frequently.  Naps for 2 to 5 hours during the day.  Reports never feeling refreshed.  -Depression: No SI/HI.  Endorses decreased motivation and lack of interest/enjoying activities.  Takes Lexapro 20 mg daily.  Has been on sertraline and another SSRI in the past.  -Dysmenorrhea: Has regular monthly menses but experiences cramping and heavy bleeding.  Feels that her both the cramping and bleeding worsened with combined OCP.  -Tailbone pain: Had a fall approximately 1 year ago and continues to have pain at the location of her tailbone.  No numbness or tingling, no weakness.    ROS:  CONSTITUTIONAL: no fatigue, no unexpected change in weight  SKIN: no worrisome rashes, no worrisome moles, no worrisome lesions  EYES: no acute vision problems or changes  ENT: no ear problems, no mouth problems, no throat problems  RESP: no significant cough, no shortness of breath  CV: no chest pain, no palpitations, no new or worsening peripheral edema  GI: no nausea, no vomiting, no constipation, no diarrhea    Sexually Active: Yes  Sexual concerns: No   Contraception:not needed   P: 0  Menarche: Patient's last menstrual period was 2021 (exact date). Menses: q 28 days  Lastin-6 days,  Normal  STD History: Neg  Last Pap Smear Date: none   Abnormal Pap History: None    Patient Active Problem List   Diagnosis     Exotropia     CARDIOVASCULAR SCREENING; LDL GOAL LESS THAN 160     Generalized anxiety disorder     Current moderate episode of major depressive disorder, unspecified whether recurrent (H)     Menorrhagia with regular cycle     Current Outpatient Medications   Medication Sig Dispense Refill     escitalopram (LEXAPRO) 20 MG tablet Take 1 tablet (20 mg) by mouth daily 90 tablet 0     hydrOXYzine (ATARAX) 25 MG tablet TAKE 1 TABLET(25 MG) BY MOUTH AT BEDTIME 60 tablet 0     Past  Medical History:   Diagnosis Date     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    Patient is an established patient of this clinic..    Social History     Tobacco Use     Smoking status: Never Smoker     Smokeless tobacco: Never Used   Substance Use Topics     Alcohol use: Yes     Alcohol/week: 0.0 standard drinks     Comment: 2-3 drinks per week     Single  Children ? no    Has anyone hurt you physically, for example by pushing, hitting, slapping or kicking you or forcing you to have sex? Denies  Do you feel threatened or controlled by a partner, ex-partner or anyone in your life? Denies    RISK BEHAVIORS AND HEALTHY HABITS:  Tobacco Use/Smoking: None  Illicit Drug Use: None  Do you use alcohol? No  Diet (5-7 servings of fruits/veg daily): No   Exercise (30 min accumulated most days):No  Dental Care: Yes   Calcium 1500 mg/d:  Yes  Seat Belt Use: Yes     Pap every 3 years for women 21-29. Recommended and patient declined testing.   HIV and hepatitis C screening:  Recommended and patient accepted testing.    Immunization History   Administered Date(s) Administered     DTAP (<7y) 04/24/2000, 05/07/2007     HEPA 10/21/2014     HPV9 03/05/2020     HepB 07/16/2007, 10/21/2014     Influenza (H1N1) 12/29/2009     Influenza (IIV3) PF 12/29/2009, 10/21/2010, 10/01/2019     Influenza Vaccine IM > 6 months Valent IIV4 10/21/2014, 10/03/2019     Influenza Vaccine, 6+MO IM (QUADRIVALENT W/PRESERVATIVES) 02/26/2019     MMR 04/24/2000, 04/24/2007, 07/16/2007     Meningococcal (Menactra ) 05/07/2007     Pneumococcal 23 valent 05/07/2007     TDAP Vaccine (Adacel) 05/07/2007, 05/02/2017     TDAP Vaccine (Boostrix) 05/07/2007     Typhoid IM 12/16/2002, 10/21/2014    Reviewed Immunization Record Today    EXAMINATION:   /68 (BP Location: Left arm, Patient Position: Sitting, Cuff Size: Adult Large)   Pulse 73   Temp 98.5  F (36.9  C) (Oral)   Resp 16   Wt 76.5 kg (168 lb 9.6 oz)   LMP 03/01/2021 (Exact Date)   SpO2 95%   Breastfeeding No   BMI 28.94 kg/m    GENERAL: healthy, alert and no distress  EYES: Eyes grossly normal to inspection, extraocular movements - intact, and PERRL  HENT: ear canals- normal; TMs- normal; Nose- normal; Mouth- no ulcers, no lesions  NECK: no tenderness, no adenopathy, no asymmetry, no masses, no stiffness; thyroid- normal to palpation  RESP: lungs clear to auscultation - no rales, no rhonchi, no wheezes  CV: regular rates and rhythm, normal S1 S2, no S3 or S4 and no murmur, no click or rub -  ABDOMEN: soft, no tenderness, no  hepatosplenomegaly, no masses, normal bowel sounds  MS: extremities- no gross deformities noted, no edema  SKIN: no suspicious lesions, no rashes  NEURO: strength and tone- normal, sensory exam- grossly normal, mentation- intact, speech- normal, reflexes- symmetric  BACK: no CVA tenderness, no paralumbar tenderness  PSYCH: Alert and oriented times 3; speech- coherent , normal rate and volume; able to articulate logical thoughts, able to abstract reason, no tangential thoughts, no hallucinations or delusions, affect- normal  LYMPHATICS: ant. cervical- normal, post. cervical- normal, supraclavicular- normal    ASSESSMENT/PLAN:  Ness was seen today for physical, imm/inj, medication reconciliation and imm/inj.    Diagnoses and all orders for this visit:    Routine general medical examination at a health care facility  -     Hemoglobin A1c ([facility])  -     HIV Ag/Ab Screen Pender ([facility])  -     Hepatitis C Antibody ([facility])  Recommended screening for diabetes today due to history of diabetes in her father diagnosed in his 30s and her elevated BMI.    Need for prophylactic vaccination and inoculation against  influenza  -     INFLUENZA VACCINE IM > 6 MONTHS VALENT IIV4 [43974]    Insomnia, unspecified type  -     traZODone (DESYREL) 50 MG tablet; Take 1 tablet (50 mg) by mouth At Bedtime  Discussed sleep hygiene and provided information on AVS.  Agreed with and reinforced sleep medicine's recommendations to eliminate daytime napping and decrease caffeine intake.  Discussed that she can continue melatonin.  Discontinued hydroxyzine as she states it was not helpful for her.  Will trial trazodone.    Dysmenorrhea  -     ibuprofen (ADVIL/MOTRIN) 200 MG tablet; Take 600 mg roughly every 6 hours for 3 days each month with your period.  States that her cramping and bleeding both worsened with combined OCP.  Will trial ibuprofen.  Could also consider combination of ibuprofen and a combined OCP.    Current moderate episode of major depressive disorder, unspecified whether recurrent (H)  Recommended follow-up. Will work on sleep and see if that helps with symptoms.    Pain in the coccyx  Recommended follow-up, was not able to address today. Chronic, no red flag symptoms.    Lisette Harrell MD PGY3

## 2021-03-30 LAB — HCV AB SER QL: NEGATIVE

## 2021-04-12 ENCOUNTER — VIRTUAL VISIT (OUTPATIENT)
Dept: PSYCHOLOGY | Facility: CLINIC | Age: 26
End: 2021-04-12
Payer: COMMERCIAL

## 2021-04-12 DIAGNOSIS — F34.1 PERSISTENT DEPRESSIVE DISORDER: Primary | Chronic | ICD-10-CM

## 2021-04-12 DIAGNOSIS — F41.1 GENERALIZED ANXIETY DISORDER: ICD-10-CM

## 2021-04-12 PROCEDURE — 99207 PR NO BILLABLE SERVICE THIS VISIT: CPT | Mod: 95 | Performed by: STUDENT IN AN ORGANIZED HEALTH CARE EDUCATION/TRAINING PROGRAM
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
## 2021-04-12 NOTE — Clinical Note
Updated the relationship and trauma section wording and billing error. The functioning section would be good to talk about at the retreat. I also want to have prompts for appetite/eating, sleep, stress management

## 2021-04-16 PROBLEM — F34.1 PERSISTENT DEPRESSIVE DISORDER: Chronic | Status: ACTIVE | Noted: 2019-10-15

## 2021-04-16 PROBLEM — F34.1 PERSISTENT DEPRESSIVE DISORDER: Chronic | Status: ACTIVE | Noted: 2021-04-12

## 2021-04-16 NOTE — PATIENT INSTRUCTIONS
It was a pleasure to work with you today Xiem!    The homework and goals we discussed today:  1) Consider goals for therapy    Our next scheduled appointment(s): 4/27/2021 @ 11 AM via video.    If you need to change a future appointment for any reason, the most efficient way to do so is to call the  at [phone].    Denia Cline, Opal.  they/them/theirs  Primary Care Behavioral Health Fellow    [facility]  Behavioral Health  [phone]    First Session Patient Information Sheet    Hello! My name is Claytonmyriam Marlyn.  I earned my doctorate in Counseling Psychology at [employer]. I am currently in a postdoctoral fellowship here at the [facility] to receive specialized training. The focus of my specialized training is behavioral health in primary care. I am also working to complete my supervised hours to become a Licensed Psychologist in the [facility].     My direct supervisor at [place] is Dr. Nancy Bradford, Ph.D. She is a Licensed Psychologist. I meet with her individually for supervision of my training and clinical caseload. Just like the residents you may have worked with, the work you and I complete together will be billed under my supervisor's name. Therefore, you will likely see reports from your insurance with Dr. Bradford's name rather than mine. She can be reached at [phone] if you have any questions or concerns.       Your first 1-2 sessions are focused on assessment. That means I will be finding out what brought you to the clinic and what you are hoping to get out of therapy. I will also ask you a lot of questions about your life including questions about your relationships, emotions, behaviors, experiences, childhood, and family. I may also ask you to complete several questionnaires and checklists as part of that assessment process.      After the assessment is completed, we will discuss the information you gave me and work together to develop a plan for  your therapy. Part of our discussion will include the most appropriate therapist to provide you with services. Most times, I will continue with your care unless it becomes clear that we need to refer you to a different therapist who can better meet your specific needs.      Most patients attend appointments once a week or once every other week. However, we will discuss a schedule that best fits your needs.       Due to confidentiality rules, I cannot exchange e-mail with patients. If you need to reach me, please call [phone].      I am committed to providing my patients with the best care possible. That means I will provide you with therapy that is the best fit for your unique needs and preferences. If there is anything you would like me to do differently, please let me know at any time.    Thank you, and I look forward to working with you!    Denia Cline Psy.D.  they/them/theirs  Primary Care Behavioral Health Fellow

## 2021-04-27 ENCOUNTER — VIRTUAL VISIT (OUTPATIENT)
Dept: PSYCHOLOGY | Facility: CLINIC | Age: 26
End: 2021-04-27
Payer: COMMERCIAL

## 2021-04-27 DIAGNOSIS — F34.1 PERSISTENT DEPRESSIVE DISORDER: Primary | Chronic | ICD-10-CM

## 2021-04-27 DIAGNOSIS — F41.1 GENERALIZED ANXIETY DISORDER: ICD-10-CM

## 2021-04-27 PROCEDURE — 90837 PSYTX W PT 60 MINUTES: CPT | Mod: 95 | Performed by: STUDENT IN AN ORGANIZED HEALTH CARE EDUCATION/TRAINING PROGRAM

## 2021-04-27 NOTE — PROGRESS NOTES
Telemedicine Visit: The patient's condition can be safely assessed and treated via synchronous audio and visual telemedicine encounter.      Reason for Telemedicine Visit: Services only offered telehealth    Originating Site (Patient Location): Patient's home    Distant Site (Provider Location): [facility]: [place]    Consent:  The patient/guardian has verbally consented to: the potential risks and benefits of telemedicine (video visit) versus in person care; bill my insurance or make self-payment for services provided; and responsibility for payment of non-covered services.     Mode of Communication:  Video Conference via Nano    As the provider I attest to compliance with applicable laws and regulations related to telemedicine.    Emergency contact: Chasity Alamo, Father  Closest Emergency Room: [facility]  Location at time of call: Home of Record    Start Time: 11:00 AM  End Time: 12:02 AM  Behavioral Health Progress Note    Client Legal Name:  Ness Alamo   Client Preferred Name:  Gabby   Service Type:  Individual  Length of Visit:  62 minutes  Attendees: Patient    Identifying Information and Presenting Problem:  Ness is a 25 year old Central African lesbian female with a history of generalized anxiety who was referred for mental health services for help with depressed mood and poor body image. She described regular experience of difficult to control worry, occasional crying spells, hypersomnia, and periodic experience of hopelessness. She described depressive symptoms secondary to minority stress related to gender, sexual, and racial identities as well as conflict with her immigrant parents    Treatment Objective(s) Addressed in This Session:  Diagnostic assessment feedback, psychoeducation, treatment plan, creation of treatment goals    Progress on / Status of Treatment Objective(s) / Homework:  Stable    PHQ-9 SCORE 3/5/2020 12/30/2020 3/29/2021   PHQ-9 Total Score 20 14 20     RICHELLE-7 SCORE  "10/15/2019 3/5/2020 12/30/2020   Total Score 18 15 3     Topics Discussed/Interventions Provided:  Session began with mood check in and collaborative creation of the agenda.  [place] items included treatment objective outlined above as well as some feelings patient was having after recently seeing posts by an expartner on social media.  All agenda items were met.    Xiem expressed agreement with the diagnostic assessment summary and demonstrated a good level of understanding regarding psychoeducation on diagnoses.  The discussion of persistent depressive disorder was particularly impactful with patient resonating strongly with the idea that this disorder often has an early and insidious onset.  The patient herself describing that this was validating given her experience a challenge since early teenage years.  We also discussed the impact of minority stress and self stigmatization.    The discussion about seeing a post from her expartner thematically involved feelings of guilt, internalized homophobia, and shame related to the relationship and how it ended.  Patient and provider spent time reviewing the use of emotional language and the importance of labeling emotions.    Assessment:   The patient appeared to be active and engaged in today's session and was receptive to feedback.  Patient expressed a good level of understanding and appreciation for the discussion today.  Also voiced a good level of motivation to continue with treatment.  No safety concerns were noted.    Mental Status:   Patient was alert  and oriented to person, place, time, and situation. During interaction with the examiner today, patient was cooperative, easy to engage, open and crying at time but able to be consoled. Dress was casual and appropriate to the weather and occasion. Grooming and hygiene were adequate. Eye contact was adequate. Speech was normal limits tone, rate, volume; largely coherent and relevant to topic. Mood was \"okay\" and " depressed Affect based on clinical observation of speech, interaction, and session content was mood congruent being full range, tearful and appropriate. Thought processes were unremarkable. Thought content was unremarkable with no evidence of psychotic features. Memory appeared grossly intact. Insight appeared adequate and judgment good; patient exhibited good impulse control during the appointment.     Does the patient appear to be at imminent risk of harm to self/others at this time?  No    The session was necessary to address symptoms of depressed mood, difficult to control worry, and hopelessness that have been interfering with patient's ability  to function in social, vocational, and personal health domains causing clinically significant distress. Ongoing psychotherapy is necessary to stabilize mood, provide counseling, improve functioning with daily activities and provide psychoeducation.    DSM-5 Diagnosis:  Persistent Depressive Disorder, with intermittent major depressive episodes, with current mild episode  Generalized Anxiety Disorder    Plan:  1. Follow up 5/11/2021 @ 11 AM [facility] via video; 5/25/2021 @ 11 AM [facility] via video  2. Work on goals as noted in patient instructions.  3. Utilize crisis resources as needed.  4. AVS will be reviewed in Luis Cline Psy.D.  they/them/theirs  Primary Care Behavioral Health Fellow    NOTE: Treatment plan update due 7/24/2021.  Diagnostic assessment update due 4/12/2022.    Treatment Plan    Client's Legal Name: Ness Alamo    Client's Preferred Name: Gabby  YOB: 1995  Today's Date: April 27, 2021    Next Treatment Plan Update Due: 7/24/2021  Next Diagnostic Update Due: 4/12/2022    DSM-V Diagnoses:   Persistent Depressive Disorder, with intermittent major depressive episodes, with current mild episode  Generalized Anxiety Disorder    Psychosocial / Contextual Factors: Ness is a 25 year old Italian lesbian female with a history of generalized  anxiety who was referred for mental health services for help with depressed mood and poor body image.    Functioning:  The patient's mental health concerns have been affecting her ability to function in social, vocational, and personal health domains causing clinically significant distress.     PHQ-9 SCORE 3/5/2020 12/30/2020 3/29/2021   PHQ-9 Total Score 20 14 20     RICHELLE-7 SCORE 10/15/2019 3/5/2020 12/30/2020   Total Score 18 15 3     PC-PTSD: 0 /4  CAGE AID: 0 /4    Collaboration: PCP: Nell Andres MD    Referral: No additional referral is indicated at this time    Anticipated treatment duration: Unknown  Agreed upon meeting frequency: every other week    Long Term Treatment Goal(s) related to diagnosis / functional impairment(s):    Goal 1: Gabby would like to feel more at peace with herself and not constantly ruminating on her shortcomings.    Steps we will take to achieve your goal:    Patient will learn to identify, monitor, challenge, and change problematic patterns of behavior and thought through introduction of mindfulness and acceptance concepts in order to promote healthy processing of symptoms and decrease suffering.    Intervention(s):  Therapist will provide support, psychoeducation and homework assignments as needed.    Goal 2: Gabby would like to be more confident, authentic, and communicative in important relationships in her life, specifically with her parents.    Steps we will take to achieve your goal:    Patient will learn interpersonal effectiveness as well as communication skills and gain confidence in their use through in-session and between-session practice.    Intervention(s):  Therapist will provide support, psychoeducation and homework assignments as needed.    If you need additional support and care during times that your therapist or PCP are not available, here are some additional resources for you:    [facility] Adult Crisis:  [phone]  [facility] Multilingual Crisis Line:   [phone]  [facility] Adult Crisis: [phone]    Crisis Text [place]: Text [place] to 828096. Free support at your fingertips 24/7  People who text [place] to 846667 will be connected with a counselor. Crisis Text Line is available 24 hours a day, seven days a week.    You can also consider going to the [facility] at the following address.  Walk ins are welcome:    [address] [place] [address]   [phone] (for 24 hour crisis consultation)    Monday - Friday 8:00am - 7:00pm  Saturday:  11:00am - 3:00pm  Sunday and Holidays Closed    If you feel at risk of immediate harm, go directly to the Emergency Department.    Patient  has reviewed and agreed to the above plan.    Denia Cline PsyD  April 27, 2021

## 2021-04-27 NOTE — PATIENT INSTRUCTIONS
It was a pleasure to work with you today Gabby!    The homework and goals we discussed today:  1) Practice emotion naming. When are emotion words helpful? When are thinking words helpful?    Our next scheduled appointment(s): 5/11/2021 @ 11 AM [facility] via video; 5/25/2021 @ 11 AM [facility] via video    If you need to change a future appointment for any reason, the most efficient way to do so is to call the  at [phone].    Ben De Los Santos.GARCIA.  they/them/theirs  Primary Care Behavioral Health Fellow  Treatment Plan    Client's Legal Name: Ness Alamo    Client's Preferred Name: Gabby  YOB: 1995  Today's Date: April 27, 2021    Next Treatment Plan Update Due: 7/24/2021  Next Diagnostic Update Due: 4/12/2022    DSM-V Diagnoses:   Persistent Depressive Disorder, with intermittent major depressive episodes, with current mild episode  Generalized Anxiety Disorder    Psychosocial / Contextual Factors: Ness is a 25 year old [place]ian lesbian female with a history of generalized anxiety who was referred for mental health services for help with depressed mood and poor body image.    Functioning:  The patient's mental health concerns have been affecting her ability to function in social, vocational, and personal health domains causing clinically significant distress.     PHQ-9 SCORE 3/5/2020 12/30/2020 3/29/2021   PHQ-9 Total Score 20 14 20     RICHELLE-7 SCORE 10/15/2019 3/5/2020 12/30/2020   Total Score 18 15 3     PC-PTSD: 0 /4  CAGE AID: 0 /4    Collaboration: PCP: Nell Andres MD    Referral: No additional referral is indicated at this time    Anticipated treatment duration: Unknown  Agreed upon meeting frequency: every other week    Long Term Treatment Goal(s) related to diagnosis / functional impairment(s):    Goal 1: Gabby would like to feel more at peace with herself and not constantly ruminating on her shortcomings.    Steps we will take to achieve your goal:    Patient will learn to identify,  monitor, challenge, and change problematic patterns of behavior and thought through introduction of mindfulness and acceptance concepts in order to promote healthy processing of symptoms and decrease suffering.    Intervention(s):  Therapist will provide support, psychoeducation and homework assignments as needed.    Goal 2: Gabby would like to be more confident, authentic, and communicative in important relationships in her life, specifically with her parents.    Steps we will take to achieve your goal:    Patient will learn interpersonal effectiveness as well as communication skills and gain confidence in their use through in-session and between-session practice.    Intervention(s):  Therapist will provide support, psychoeducation and homework assignments as needed.    If you need additional support and care during times that your therapist or PCP are not available, here are some additional resources for you:    [facility] Adult Crisis:  [phone]  [facility] Multilingual Crisis Line:  [phone]  [facility] Adult Crisis: [phone]    Crisis [place]: Text [place] to 565471. Free support at your fingertips 24/7  People who text [place] to 963579 will be connected with a counselor. Crisis Text Line is available 24 hours a day, seven days a week.    You can also consider going to the [facility] at the following address.  Walk ins are welcome:    [address] [place] [address]   [phone] (for 24 hour crisis consultation)    Monday - Friday 8:00am - 7:00pm  Saturday:  11:00am - 3:00pm  Sunday and Holidays Closed    If you feel at risk of immediate harm, go directly to the Emergency Department.    Patient  has reviewed and agreed to the above plan.    Denia Cline PsyD  April 27, 2021

## 2021-04-27 NOTE — PROGRESS NOTES
I have reviewed and agree with the behavioral health fellow's documentation for this visit.  I did not personally see the patient.  Nancy Bradford, PhD., LP

## 2021-04-29 NOTE — ADDENDUM NOTE
Addended by: KAREN MAGUIRE on: 4/29/2021 09:05 AM     Modules accepted: Orders, Level of Service

## 2021-05-04 NOTE — PROGRESS NOTES
I have reviewed and agree with the behavioral health fellow's documentation for this visit.  I did not personally see the patient.  Nancy Bradford, ., LP

## 2021-05-11 ENCOUNTER — VIRTUAL VISIT (OUTPATIENT)
Dept: PSYCHOLOGY | Facility: CLINIC | Age: 26
End: 2021-05-11
Payer: COMMERCIAL

## 2021-05-11 DIAGNOSIS — F41.1 GENERALIZED ANXIETY DISORDER: ICD-10-CM

## 2021-05-11 DIAGNOSIS — F34.1 PERSISTENT DEPRESSIVE DISORDER: Primary | Chronic | ICD-10-CM

## 2021-05-11 PROCEDURE — 90834 PSYTX W PT 45 MINUTES: CPT | Mod: 95 | Performed by: STUDENT IN AN ORGANIZED HEALTH CARE EDUCATION/TRAINING PROGRAM

## 2021-05-11 NOTE — PROGRESS NOTES
Telemedicine Visit: The patient's condition can be safely assessed and treated via synchronous audio and visual telemedicine encounter.      Reason for Telemedicine Visit: Patient has requested telehealth visit    Originating Site (Patient Location): Patient's home    Distant Site (Provider Location): [facility]: [place]    Consent:  The patient/guardian has verbally consented to: the potential risks and benefits of telemedicine (video visit) versus in person care; bill my insurance or make self-payment for services provided; and responsibility for payment of non-covered services.     Mode of Communication:  Video Conference via [employer]    As the provider I attest to compliance with applicable laws and regulations related to telemedicine.    Emergency contact: Chasity Alamo, Father  Closest Emergency Room: [facility] [place]  Location at time of call: Home of Record     Start Time: 11:03 AM  End Time: 11:50 AM  Behavioral Health Progress Note    Client Legal Name:  Ness Alamo   Client Preferred Name:  Gabby   Service Type:  Individual - video  Length of Visit:  47 minutes  Attendees: Patient attended alone    Identifying Information and Presenting Problem:  Ness is a 25 year old [place] lesbian female with a history of generalized anxiety who was referred for mental health services for help with depressed mood and poor body image. She described regular experience of difficult to control worry, occasional crying spells, hypersomnia, and periodic experience of hopelessness. She described depressive symptoms secondary to minority stress related to gender, sexual, and racial identities as well as conflict with her immigrant parents.    Treatment Objective(s) Addressed in This Session:  Goal 2: Gabby would like to be more confident, authentic, and communicative in important relationships in her life, specifically with her parents.      Progress on / Status of Treatment Objective(s) / Homework:  Stable     PHQ  "3/5/2020 12/30/2020 3/29/2021   PHQ-9 Total Score 20 14 20   Q9: Thoughts of better off dead/self-harm past 2 weeks Not at all Not at all Not at all      RICHELLE-7 SCORE 10/15/2019 3/5/2020 12/30/2020   Total Score 18 15 3     Topics Discussed/Interventions Provided:  Session began with mood check-in, Gabby apologized for being a couple of minutes late describing that she woke up with cold symptoms. Her mood lately has been depressed and spoke about some challenging interactions with her mother. She has been working hard to catch up on work but is struggling with motivation. We explored her interactions with her mother and her disclosures about feeling powerless with Socratic questioning while provider encouraged emotion labeling. Provider gave some education about minority stress model which was helpful for Gabby to conceptualize herself. We processed some complex feeling about Gabby's experience as a person of color in a relationship with someone who identifies as white. Gabby observed how conversation today is related to her goal for individual therapy which is to work on communication of feelings.    Assessment:   The patient appeared to be active and engaged in today's session and was receptive to feedback. Gabby continues to be more vulnerable and open during session. She expressed to good level of motivation to continue therapy. Next session update measurement based care. No safety concerns were noted.    Mental Status:   Patient was alert  and oriented to person, place, time, and situation. During interaction with the examiner today, patient was cooperative, engaged and open. Dress was casual and appropriate to the weather and occasion. Grooming and hygiene were adequate. Eye contact was adequate. Speech was normal limits tone, rate, volume; largely coherent and relevant to topic. Mood was \"fine\" and euthymic Affect based on clinical observation of speech, interaction, and session content was mood congruent being " full range and appropriate. Thought processes were unremarkable. Thought content was unremarkable with no evidence of psychotic features and no evidence of suicide, homicide, or nonsuicidal self injury related thoughts, intent, urges, planning, behavior/recent attempts. Memory appeared grossly intact. Insight appeared good and judgment good; patient exhibited good impulse control during the appointment.     Does the patient appear to be at imminent risk of harm to self/others at this time? No    The session was necessary to address symptoms of depressed mood, difficult to control worry, and hopelessness that have been interfering with patient's ability  to function in social, vocational, and personal health domains causing clinically significant distress. Ongoing psychotherapy is necessary to stabilize mood, provide counseling, improve functioning with daily activities and provide psychoeducation.    DSM-5 Diagnosis:  Persistent Depressive Disorder, with intermittent major depressive episodes, with current mild episode  Generalized Anxiety Disorder     Plan:  1. Follow up 5/25/2021 @ 11 AM [facility] via video  2. Work on goals as noted in patient instructions.  3. Utilize crisis resources as needed.  4. AVS will be reviewed in Luis Cline Psy.D.  they/them/theirs  Primary Care Behavioral Health Fellow    NOTE: Treatment plan update due 7/24/2021.  Diagnostic assessment update due 4/12/2022.

## 2021-05-17 NOTE — PATIENT INSTRUCTIONS
It was a pleasure to work with you today Xiem!    The homework and goals we discussed today:  1) Daily reflection, work on using emotion language.    Our next scheduled appointment(s): 5/25/2021 @ 11 AM [facility] via video    If you need to change a future appointment for any reason, the most efficient way to do so is to call the  at [phone].    Denia Cline, Psharry.GARCIA.  they/them/theirs  Primary Care Behavioral Health Fellow

## 2021-05-25 ENCOUNTER — VIRTUAL VISIT (OUTPATIENT)
Dept: PSYCHOLOGY | Facility: CLINIC | Age: 26
End: 2021-05-25
Payer: COMMERCIAL

## 2021-05-25 DIAGNOSIS — F34.1 PERSISTENT DEPRESSIVE DISORDER: Primary | Chronic | ICD-10-CM

## 2021-05-25 DIAGNOSIS — F41.1 GENERALIZED ANXIETY DISORDER: ICD-10-CM

## 2021-05-25 PROCEDURE — 90832 PSYTX W PT 30 MINUTES: CPT | Mod: 95 | Performed by: STUDENT IN AN ORGANIZED HEALTH CARE EDUCATION/TRAINING PROGRAM

## 2021-05-25 NOTE — PROGRESS NOTES
Telemedicine Visit: The patient's condition can be safely assessed and treated via synchronous audio and visual telemedicine encounter.      Reason for Telemedicine Visit: Patient convenience (e.g. access to timely appointments / distance to available provider)    Originating Site (Patient Location): Patient's home    Distant Site (Provider Location): [facility]: [place]    Consent:  The patient/guardian has verbally consented to: the potential risks and benefits of telemedicine (video visit) versus in person care; bill my insurance or make self-payment for services provided; and responsibility for payment of non-covered services.     Mode of Communication:  Video Conference via [employer]    As the provider I attest to compliance with applicable laws and regulations related to telemedicine.    Emergency contact: Chasity Alamo,   Closest Emergency Room: [facility]  Location at time of call: Home of Record    Start Time: 11:25 AM  End Time: 11:55 AM    Behavioral Health Progress Note    Client Legal Name:  Ness Alamo   Client Preferred Name:  Gabby   Service Type:  Individual  Length of Visit:  30 minutes  Attendees:  Patient attended alone     Identifying Information and Presenting Problem:  Ness is a 25 year old Indian lesbian female with a history of generalized anxiety who was referred for mental health services for help with depressed mood and poor body image. She described regular experience of difficult to control worry, occasional crying spells, hypersomnia, and periodic experience of hopelessness. She described depressive symptoms secondary to minority stress related to gender, sexual, and racial identities as well as conflict with her immigrant parents.    Treatment Objective(s) Addressed in This Session:  Goal 2: Gabby would like to be more confident, authentic, and communicative in important relationships in her life, specifically with her parents.    Progress on / Status of Treatment  "Objective(s) / Homework:  Stable     PHQ 3/5/2020 12/30/2020 3/29/2021   PHQ-9 Total Score 20 14 20   Q9: Thoughts of better off dead/self-harm past 2 weeks Not at all Not at all Not at all      RICHELLE-7 SCORE 10/15/2019 3/5/2020 12/30/2020   Total Score 18 15 3     Topics Discussed/Interventions Provided:  Session began with Gabby apologizing for being late stating that she had just gotten up. She has been working really late into the evenings getting several projects done before the end of the month. We spent several minutes talking about her mood recently which has been more irritable and depressed relative to previous sessions. She has been thinking a lot about communication, we spent the majority of our discussion reviewing her recent experience at [facility]'s counseling session. Discussion of emotions has always been challenging and Gabby is working to use more emotion based language when she speaks with her partner. We discussed the role that emotions play in communication with other people and in our relationship with ourselves.    Assessment:   The patient appeared to be active and engaged in today's session and was receptive to feedback. No safety concerns were noted.    Mental Status:   Patient was alert  and oriented to person, place, time, and situation. During interaction with the examiner today, patient was cooperative and easy to engage. Dress was casual and appropriate to the weather and occasion. Grooming and hygiene were adequate. Eye contact was adequate. Speech was normal limits tone, rate, volume; largely coherent and relevant to topic. Mood was \"okay\" and fatigued Affect based on clinical observation of speech, interaction, and session content was mood congruent being blunted. Thought processes were unremarkable. Thought content was unremarkable with no evidence of psychotic features and no evidence of suicide, homicide, or nonsuicidal self injury related thoughts, intent, urges, planning, " behavior/recent attempts. Memory appeared grossly intact. Insight appeared adequate and judgment good; patient exhibited good impulse control during the appointment.     Does the patient appear to be at imminent risk of harm to self/others at this time? No    The session was necessary to address symptoms of depressed mood, difficult to control worry, and hopelessness that have been interfering with patient's ability  to function in social, vocational, and personal health domains causing clinically significant distress. Ongoing psychotherapy is necessary to stabilize mood, provide counseling, improve functioning with daily activities and provide psychoeducation.     DSM-5 Diagnosis:  Persistent Depressive Disorder, with intermittent major depressive episodes, with current mild episode  Generalized Anxiety Disorder    Plan:  1. Follow up 6/14/2021 @ 2:00 PM via video  2. Work on goals as noted in patient instructions.  3. Utilize crisis resources as needed.  4. AVS will be reviewed in Luis Cline Psy.D.  they/them/theirs  Primary Care Behavioral Health Fellow     NOTE: Treatment plan update due 7/24/2021.  Diagnostic assessment update due 4/12/2022.

## 2021-06-02 NOTE — PATIENT INSTRUCTIONS
It was a pleasure to work with you today Xiem!    The homework and goals we discussed today:  1) Continue to practice using emotion language and noticing self judgments.    Our next scheduled appointment(s): 6/14/2021 @ 2:00 PM via video    If you need to change a future appointment for any reason, the most efficient way to do so is to call the  at [phone].    Denia Cline, PsJanel.  they/them/theirs  Primary Care Behavioral Health Fellow

## 2021-06-14 ENCOUNTER — VIRTUAL VISIT (OUTPATIENT)
Dept: PSYCHOLOGY | Facility: CLINIC | Age: 26
End: 2021-06-14
Payer: COMMERCIAL

## 2021-06-14 DIAGNOSIS — F34.1 PERSISTENT DEPRESSIVE DISORDER: Primary | Chronic | ICD-10-CM

## 2021-06-14 DIAGNOSIS — F41.1 GENERALIZED ANXIETY DISORDER: ICD-10-CM

## 2021-06-14 PROCEDURE — 90837 PSYTX W PT 60 MINUTES: CPT | Mod: 95 | Performed by: STUDENT IN AN ORGANIZED HEALTH CARE EDUCATION/TRAINING PROGRAM

## 2021-06-14 NOTE — PROGRESS NOTES
Telemedicine Visit: The patient's condition can be safely assessed and treated via synchronous audio and visual telemedicine encounter.      Reason for Telemedicine Visit: Patient convenience (e.g. access to timely appointments / distance to available provider)    Originating Site (Patient Location): Patient's home    Distant Site (Provider Location): [facility]: [place]    Consent:  The patient/guardian has verbally consented to: the potential risks and benefits of telemedicine (video visit) versus in person care; bill my insurance or make self-payment for services provided; and responsibility for payment of non-covered services.     Mode of Communication:  Video Conference via [employer]    As the provider I attest to compliance with applicable laws and regulations related to telemedicine.    Emergency contact: Chasity Alamo,   Closest Emergency Room: [facility]  Location at time of call: Home of Record    Start Time: 2:01 PM  End Time: 3:01    Behavioral Health Progress Note    Client Legal Name:  Ness Alamo   Client Preferred Name:  Gabby   Service Type:  Individual via video  Length of Visit: 60 minutes  Attendees: Patient attended alone      Identifying Information and Presenting Problem:  Ness is a 25 year old second generation Greek lesbian female with a history of generalized anxiety who was referred for mental health services for help with depressed mood and poor body image. She described regular experience of difficult to control worry, occasional crying spells, hypersomnia, and periodic experience of hopelessness. She described depressive symptoms secondary to minority stress related to gender, sexual, and racial identities as well as conflict with her migrant parents.     Treatment Objective(s) Addressed in This Session:  Goal 2: Gabby would like to be more confident, authentic, and communicative in important relationships in her life, specifically with her parents.    Progress on /  "Status of Treatment Objective(s) / Homework:  Stable      PHQ 3/5/2020 12/30/2020 3/29/2021   PHQ-9 Total Score 20 14 20   Q9: Thoughts of better off dead/self-harm past 2 weeks Not at all Not at all Not at all      RICHELLE-7 SCORE 10/15/2019 3/5/2020 12/30/2020   Total Score 18 15 3     Topics Discussed/Interventions Provided:  Session began with mood check in and updates over the past two weeks. Generally, Gabby shared that she has been feeling stuck in regard to her depression. We discussed types of avoidance - emotional, cognitive, behavioral and explored Gabby's patterns over the past few weeks. We discussed the emotion of shame for much of session with provider giving a bit of education about shame and social learning theory. Gabby observed that she began to be outwardly shamed for doing art around middle school when she stopped presenting feminine. We discussed how the unspoken message was that the was no longer living up to the \"type of daughter\" that her mother wanted. Session concluded with a discussion of how shame behaviors related to gender presentation and art continue to impact Gabby and how to use present moment awareness to recognize this avoidance.    Assessment:   The patient appeared to be active and engaged in today's session and was receptive to feedback. Gabby continues to struggle with insight into the motivations behind her daily experience, today's discussion was helpful to explore the origin of this behavior. Next session might include skills/strategies to combat avoidance. No safety concerns were noted.    Mental Status:   Patient was alert  and oriented to person, place, time, and situation. During interaction with the examiner today, patient was cooperative, easy to engage and respectful. Dress was casual and appropriate to the weather and occasion. Grooming and hygiene were appropriate. Eye contact was adequate. Speech was normal limits tone, rate, volume; largely coherent and relevant to " "topic. Mood was \"okay\" and neutral Affect based on clinical observation of speech, interaction, and session content was mood congruent being full range and appropriate. Thought processes were unremarkable. Thought content was unremarkable with no evidence of psychotic features and no evidence of suicide, homicide, or nonsuicidal self injury related thoughts, intent, urges, planning, behavior/recent attempts. Memory appeared grossly intact. Insight appeared adequate and judgment good; patient exhibited good impulse control during the appointment.     Does the patient appear to be at imminent risk of harm to self/others at this time? No    The session was necessary to address symptoms of depressed mood, difficult to control worry, and hopelessness that have been interfering with patient's ability  to function in social, vocational, and personal health domains causing clinically significant distress. Ongoing psychotherapy is necessary to stabilize mood, provide counseling, improve functioning with daily activities and provide psychoeducation.     DSM-5 Diagnosis:  Persistent Depressive Disorder, with intermittent major depressive episodes, with current mild episode  Generalized Anxiety Disorder    Plan:  1. Follow up 6/28/2021 @ 2:00 PM via video  2. Work on goals as noted in patient instructions.  3. Utilize crisis resources as needed.  4. AVS will be reviewed in Luis Cline Psy.D.  they/them/theirs  Primary Care Behavioral Health Fellow    NOTE: Treatment plan update due 7/24/2021.  Diagnostic assessment update due 4/12/2022.    "

## 2021-06-17 NOTE — PATIENT INSTRUCTIONS
It was a pleasure to work with you today Xiem!    The homework and goals we discussed today:  1) Write down the top 3 things you have been or want to avoid, consider developing a plan on how to manage this avoidance.    Our next scheduled appointment(s): 6/28/2021 @ 2:00 PM via video    If you need to change a future appointment for any reason, the most efficient way to do so is to call the  at [phone].    Denia Cline, Psharry.[email or web address].  they/them/theirs  Primary Care Behavioral Health Fellow

## 2021-06-28 ENCOUNTER — VIRTUAL VISIT (OUTPATIENT)
Dept: PSYCHOLOGY | Facility: CLINIC | Age: 26
End: 2021-06-28
Payer: COMMERCIAL

## 2021-06-28 DIAGNOSIS — F41.1 GENERALIZED ANXIETY DISORDER: ICD-10-CM

## 2021-06-28 DIAGNOSIS — F34.1 PERSISTENT DEPRESSIVE DISORDER: Primary | Chronic | ICD-10-CM

## 2021-06-28 PROCEDURE — 90834 PSYTX W PT 45 MINUTES: CPT | Mod: 95 | Performed by: STUDENT IN AN ORGANIZED HEALTH CARE EDUCATION/TRAINING PROGRAM

## 2021-06-28 NOTE — PROGRESS NOTES
Telemedicine Visit: The patient's condition can be safely assessed and treated via synchronous audio and visual telemedicine encounter.      Reason for Telemedicine Visit: Patient convenience (e.g. access to timely appointments / distance to available provider)    Originating Site (Patient Location): Patient's home    Distant Site (Provider Location): [facility]: [place]    Consent:  The patient/guardian has verbally consented to: the potential risks and benefits of telemedicine (video visit) versus in person care; bill my insurance or make self-payment for services provided; and responsibility for payment of non-covered services.     Mode of Communication:  Video Conference via [employer]    As the provider I attest to compliance with applicable laws and regulations related to telemedicine.    Emergency contact: Chasity Alamo,   Closest Emergency Room: [facility]  Location at time of call: Home of Record     Start Time: 2:07 PM  End Time: 2:54 PM    Behavioral Health Progress Note    Client Legal Name:  Ness Alamo   Client Preferred Name:  Gabby   Service Type:  Individual - via video  Length of Visit: 47 minutes  Attendees: Patient attended alone    Identifying Information and Presenting Problem:  Gabby is a 25 year old second generation Maori lesbian female with a history of generalized anxiety who was referred for mental health services for help with depressed mood and poor body image. She described regular experience of difficult to control worry, occasional crying spells, hypersomnia, and periodic experience of hopelessness. She described depressive symptoms secondary to minority stress related to gender, sexual, and racial identities as well as conflict with her migrant parents.     Treatment Objective(s) Addressed in This Session:  Goal 1: Gabby would like to feel more at peace with herself and not constantly ruminating on her shortcomings.  Goal 2: Gabby would like to be more confident,  "authentic, and communicative in important relationships in her life, specifically with her parents.     Progress on / Status of Treatment Objective(s) / Homework:  Stable     PHQ 3/5/2020 12/30/2020 3/29/2021   PHQ-9 Total Score 20 14 20   Q9: Thoughts of better off dead/self-harm past 2 weeks Not at all Not at all Not at all      RICHELLE-7 SCORE 10/15/2019 3/5/2020 12/30/2020   Total Score 18 15 3     Topics Discussed/Interventions Provided:  Session began with mood check in and Gabby sharing that she wasn't sure what to cover in session today. She noted that her sleep has been exceptionally bad this past week; however, she was able to get a lot of work done yesterday. 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 Much of session was spent discussing avoidance, specifically how hypersomnia functions as an avoidance behavior for Gabby. Provider listened with empathy and provided validation as we discussed how avoidance behavior is a significant part of how depression persists. 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    Assessment:   The patient appeared to be active and engaged in today's session and was receptive to feedback. No acute risk concerns noted.    Mental Status:   Patient was alert  and oriented to person, place, time, and situation. During interaction with the examiner today, patient was cooperative, engaged and open. Dress was appropriate and appropriate to the weather and occasion. Grooming and hygiene were appropriate. Eye contact was adequate. Speech was normal limits tone, rate, volume; largely coherent and relevant to topic. Mood was \"okay\" and neutral; " affect based on clinical observation of speech, interaction, and session content was mood congruent being full range and appropriate. Thought processes were unremarkable. Thought content was unremarkable with no evidence of psychotic features and no evidence of suicide, homicide, or nonsuicidal self injury related thoughts, intent, urges, planning, behavior/recent attempts. Memory appeared grossly intact. Insight appeared adequate and judgment adequate; patient exhibited good impulse control during the appointment.     Does the patient appear to be at imminent risk of harm to self/others at this time? No    The session was necessary to address symptoms of depressed mood, difficult to control worry, and hopelessness that have been interfering with patient's ability  to function in social, vocational, and personal health domains causing clinically significant distress. Ongoing psychotherapy is necessary to stabilize mood, provide counseling, improve functioning with daily activities and provide psychoeducation.     DSM-V Diagnosis:  Persistent Depressive Disorder, with intermittent major depressive episodes, with current mild episode  Generalized Anxiety Disorder    Plan:  1. Follow up 7/12/2021 @ 2 PM via video  2. Work on goals as noted in patient instructions.  3. Utilize crisis resources as needed.  4. AVS will be reviewed in Luis Cline Psy.D.  they/them/theirs  Primary Care Behavioral Health Fellow     NOTE: Treatment plan update due 7/24/2021.  Diagnostic assessment update due 4/12/2022.    Legal Screen Completed: No

## 2021-06-28 NOTE — PATIENT INSTRUCTIONS
It was a pleasure to work with you today Xiem!    The homework and goals we discussed today:  1) 3 Column CBT Thought record. Situation (5 W's), Emotion, Thoughts    Our next scheduled appointment(s): 7/12/2021 @ 2 PM via video    If you need to change a future appointment for any reason, the most efficient way to do so is to call the  at [phone].    Denia Cline, PsJanel.  they/them/theirs  Primary Care Behavioral Health Fellow

## 2021-07-12 ENCOUNTER — VIRTUAL VISIT (OUTPATIENT)
Dept: PSYCHOLOGY | Facility: CLINIC | Age: 26
End: 2021-07-12
Payer: COMMERCIAL

## 2021-07-12 DIAGNOSIS — F41.1 GENERALIZED ANXIETY DISORDER: ICD-10-CM

## 2021-07-12 DIAGNOSIS — F34.1 PERSISTENT DEPRESSIVE DISORDER: Primary | Chronic | ICD-10-CM

## 2021-07-12 PROCEDURE — 90834 PSYTX W PT 45 MINUTES: CPT | Mod: 95 | Performed by: STUDENT IN AN ORGANIZED HEALTH CARE EDUCATION/TRAINING PROGRAM

## 2021-07-12 ASSESSMENT — ANXIETY QUESTIONNAIRES
1. FEELING NERVOUS, ANXIOUS, OR ON EDGE: NOT AT ALL
6. BECOMING EASILY ANNOYED OR IRRITABLE: NEARLY EVERY DAY
IF YOU CHECKED OFF ANY PROBLEMS ON THIS QUESTIONNAIRE, HOW DIFFICULT HAVE THESE PROBLEMS MADE IT FOR YOU TO DO YOUR WORK, TAKE CARE OF THINGS AT HOME, OR GET ALONG WITH OTHER PEOPLE: EXTREMELY DIFFICULT
7. FEELING AFRAID AS IF SOMETHING AWFUL MIGHT HAPPEN: SEVERAL DAYS
2. NOT BEING ABLE TO STOP OR CONTROL WORRYING: SEVERAL DAYS
5. BEING SO RESTLESS THAT IT IS HARD TO SIT STILL: NOT AT ALL
3. WORRYING TOO MUCH ABOUT DIFFERENT THINGS: SEVERAL DAYS
GAD7 TOTAL SCORE: 6

## 2021-07-12 ASSESSMENT — PATIENT HEALTH QUESTIONNAIRE - PHQ9
SUM OF ALL RESPONSES TO PHQ QUESTIONS 1-9: 13
5. POOR APPETITE OR OVEREATING: NOT AT ALL

## 2021-07-12 NOTE — PROGRESS NOTES
Telemedicine Visit: The patient's condition can be safely assessed and treated via synchronous audio and visual telemedicine encounter.      Reason for Telemedicine Visit: Patient has requested telehealth visit    Originating Site (Patient Location): Patient's home    Distant Site (Provider Location): [facility]: [place]    Consent:  The patient/guardian has verbally consented to: the potential risks and benefits of telemedicine (video visit) versus in person care; bill my insurance or make self-payment for services provided; and responsibility for payment of non-covered services.     Mode of Communication:  Video Conference via [employer]    As the provider I attest to compliance with applicable laws and regulations related to telemedicine.    Emergency contact: Chasity Alamo, Father  Closest Emergency Room: [facility]  Location at time of call: Home of Record     Start Time: 1:45 PM  End Time: 2:55 PM    Behavioral Health Progress Note    Client Legal Name:  Ness Alamo   Client Preferred Name:  Gabby   Service Type:  Individual via video  Length of Visit: 55 minutes  Attendees:  Patient attended alone     Identifying Information and Presenting Problem:  Gabby is a 25 year old second generation Tajik lesbian female with a history of generalized anxiety who was referred for mental health services for help with depressed mood and poor body image. She described regular experience of difficult to control worry, occasional crying spells, hypersomnia, and periodic experience of hopelessness. She described depressive symptoms secondary to minority stress related to gender, sexual, and racial identities as well as conflict with her migrant parents.     Treatment Objective(s) Addressed in This Session:  Goal 1: Gabby would like to feel more at peace with herself and not constantly ruminating on her shortcomings.  Goal 2: Gabby would like to be more confident, authentic, and communicative in important  relationships in her life, specifically with her parents.    Progress on / Status of Treatment Objective(s) / Homework:  Stable     PHQ 12/30/2020 3/29/2021 7/12/2021   PHQ-9 Total Score 14 20 13   Q9: Thoughts of better off dead/self-harm past 2 weeks Not at all Not at all Not at all      RICHELLE-7 SCORE 3/5/2020 12/30/2020 7/12/2021   Total Score 15 3 6     Topics Discussed/Interventions Provided:  Session began with mood check in and Gabby offering several items for the agenda. Gabby described her mood as depressed having recently had a conversation with her partner that did not go well. She wanted to talk about that discussion with partner, her sleep schedule, and her experience of the thought journaling homework from last week.  First we addressed sleep gathering some information about current sleep habits. Provider gave some education about sleep cycles, sleep restriction, and sleep hygiene. Gabby identified that they might work on sleeping less during the day and being more consistent with sleep schedule.    Using Interpersonal Psychotherapy Communication Analysis, patient and provider walked/talked through the recently challenging conversation Gabby had with her partner. Generally, Gabby reflected that she could potentially have been more specific and transparent with her feelings during the discussion.     Reviewing CBT thought record, Gabby described feeling sad and discouraged that she saw so many of the items were related to feeling sad or angry - avoiding/wanting to avoid the situation, or generally depressed. Provider listened with empathy and encouraged Gabby to look a bit deeper to identify other emotions/thoughts such as embarrassment being behind anger.    Assessment:   The patient appeared to be active and engaged in today's session and was receptive to feedback. No safety concerns.    Mental Status:   Patient was alert  and oriented to person, place, time, and situation. During interaction with the examiner  "today, patient was cooperative, easy to engage, anxious and respectful. Dress was casual and appropriate to the weather and occasion. Grooming and hygiene were good. Eye contact was fair. Speech was normal limits tone, rate, volume; largely coherent and relevant to topic. Mood was \"okay\" and down; affect based on clinical observation of speech, interaction, and session content was mood congruent being full range and appropriate. Thought processes were unremarkable. Thought content was unremarkable with no evidence of psychotic features and no evidence of suicide, homicide, or nonsuicidal self injury related thoughts, intent, urges, planning, behavior/recent attempts. Memory appeared grossly intact. Insight appeared fair and judgment adequate; patient exhibited good impulse control during the appointment.     Does the patient appear to be at imminent risk of harm to self/others at this time? No    The session was necessary to address symptoms of depressed mood, difficult to control worry, and hopelessness that have been interfering with patient's ability  to function in social, vocational, and personal health domains causing clinically significant distress. Ongoing psychotherapy is necessary to stabilize mood, provide counseling, improve functioning with daily activities and provide psychoeducation.     DSM-V Diagnosis:  Persistent Depressive Disorder, with intermittent major depressive episodes, with current mild episode  Generalized Anxiety Disorder    Plan:  1. Follow up 7/29/2021 @ 11:30 via video  2. Work on goals as noted in patient instructions.  3. Utilize crisis resources as needed.  4. AVS will be reviewed in Luis Cline Psy.D.  they/them/theirs  Primary Care Behavioral Health Fellow    NOTE: Treatment plan update due 7/24/2021.  Diagnostic assessment update due 4/12/2022.     Legal Screen Completed: No  " Xelpacoz Pregnancy And Lactation Text: This medication is Pregnancy Category D and is not considered safe during pregnancy.  The risk during breast feeding is also uncertain.

## 2021-07-13 ASSESSMENT — ANXIETY QUESTIONNAIRES: GAD7 TOTAL SCORE: 6

## 2021-07-15 NOTE — PATIENT INSTRUCTIONS
It was a pleasure to work with you today Xiem!    The homework and goals we discussed today:  1) Continue to do the 3 column thought record. Stretch yourself to move beyond angry and depressed, what other emotions might be lurking there? What other thoughts did you have about the situation? We regularly can have multiple thoughts/feelings/reactions to the same situation.    Our next scheduled appointment(s): 7/29/2021 @ 11:30 via video    If you need to change a future appointment for any reason, the most efficient way to do so is to call the  at [phone].    Denia Cline Psy.D.  they/them/theirs  Primary Care Behavioral Health Fellow

## 2021-07-16 NOTE — PROGRESS NOTES
I have reviewed and agree with the behavioral health fellow's documentation for this visit.  I did not personally see the patient.  Nnacy Bradford, PhD., LP

## 2021-08-02 ENCOUNTER — VIRTUAL VISIT (OUTPATIENT)
Dept: PSYCHOLOGY | Facility: CLINIC | Age: 26
End: 2021-08-02
Payer: COMMERCIAL

## 2021-08-02 ENCOUNTER — TELEPHONE (OUTPATIENT)
Dept: PSYCHOLOGY | Facility: CLINIC | Age: 26
End: 2021-08-02
Payer: COMMERCIAL

## 2021-08-02 DIAGNOSIS — F34.1 PERSISTENT DEPRESSIVE DISORDER: Primary | ICD-10-CM

## 2021-08-02 DIAGNOSIS — F41.1 GENERALIZED ANXIETY DISORDER: ICD-10-CM

## 2021-08-02 PROCEDURE — 90834 PSYTX W PT 45 MINUTES: CPT | Mod: 95 | Performed by: STUDENT IN AN ORGANIZED HEALTH CARE EDUCATION/TRAINING PROGRAM

## 2021-08-02 PROCEDURE — 99207 PR NO CHARGE LOS: CPT | Performed by: STUDENT IN AN ORGANIZED HEALTH CARE EDUCATION/TRAINING PROGRAM

## 2021-08-02 NOTE — PROGRESS NOTES
"Telemedicine Visit: The patient's condition can be safely assessed and treated via synchronous audio and visual telemedicine encounter.      Reason for Telemedicine Visit: Patient has requested telehealth visit and Patient convenience (e.g. access to timely appointments / distance to available provider)    Originating Site (Patient Location): Patient's home    Distant Site (Provider Location): [facility]: [place]    Consent:  The patient/guardian has verbally consented to: the potential risks and benefits of telemedicine (video visit) versus in person care; bill my insurance or make self-payment for services provided; and responsibility for payment of non-covered services.     Mode of Communication:  Video Conference via [employer]    As the provider I attest to compliance with applicable laws and regulations related to telemedicine.    Emergency contact: Chasity Alamo, father  Location at time of call: Home of record    Behavioral Health Progress Note    Client's Legal Name: Ness Alamo    Client's Preferred Name: Gabby  YOB: 1995  Type of Service: video and counseling  Length of Service:   Start time: 4:02 PM    End time: 4:43 PM   Duration: 41 minutes  Attendees: patient    Identifying Information and Presenting Problem:  Gabby is a 25 year old second-generation [place] lesbian female who is being seen for problematic symptoms of generalized anxiety, chronic depression, and poor body image.    Treatment Objective(s) Addressed in This Session:  Review of Treatment Plan  Goal 1: Gabby will experience fewer depressive symptoms \"I want to not feel like everything is too much work\"  Goal 2: Gabby wants to improve her self confidence and be more engaged in life.    Progress on / Status of Treatment Objective(s) / Homework:  Stable     Patient Health Questionnaire - 9:  PHQ 7/12/2021 3/29/2021 12/30/2020   PHQ-9 Total Score 13 20 14   Q9: Thoughts of better off dead/self-harm past 2 weeks Not " "at all Not at all Not at all      Generalized Anxiety Disorder - 7:   RICHELLE-7 SCORE 7/12/2021 12/30/2020 3/5/2020   Total Score 6 3 15     Topics Discussed/Interventions Provided:  Session began today with Gabby apologizing again about missing our appointment next week and saying that she was not sure what she wanted to talk about today. Provider introduced an agenda item for today to review Gabby's treatment plan and checking up on therapy goals.     Patient and provider reviewed Gabby's original therapy goals, she does not necessarily feel that she has made any progress toward either of her therapy goals. Generally, Gabby feels stuck in a state of existential depression while noting that she has changed several of her behaviors in the past month. 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 Provider praised her for those changes. Patient and provider discussed Gabby's experience of depression as not \"feeling engaged\" in any particular area of life. We discussed the concept of valued congruent living and values clarification.     The last part of session was an interpersonal process approach discussion where provider encouraged Gabby to consider doing therapy in person. Gabby was hesitant at first but agreed when we discussed the pros and cons of in person vs virtual.    Assessment:   The patient appeared to be active and engaged in today's session and was receptive to feedback. Gabby continues to think of her depression very philosophically and as the majority of her identity. She will schedule a follow up with her primary care provider to review medication options. Our next sessions should work to redefine her therapy goals in meaningful and/or behavioral terms. No safety concerns.    Mental Status:   During interaction with the examiner today, Gabby was cooperative, open, engaged and respectful. Patient was generally alert and oriented to person, place, time, and situation. They " "were casually dressed and appropriately groomed. Patient's attire was appropriate for the weather and occasion. Eye contact was adequate; psychomotor functioning  normal or unremarkable. Speech was normal limits tone, rate, volume; largely coherent and relevant to topic. Mood was \"okay\", overwhelmed and apathetic; affect was blunted and mood-congruent. Thought processes were unremarkable. Thought content was not remarkable with no evidence of psychotic features and no evidence of suicide, homicide, or nonsuicidal self injury related thoughts, intent, urges, planning, behavior/recent attempts. Memory appeared grossly intact without being formally evaluated. Insight appeared fair and judgment good. Patient exhibited good impulse control during the appointment.     Does the patient appear to be at imminent risk of harm to self/others at this time? No    The session was necessary to address symptoms of depression, anxiety, and poor body image that have been interfering with patient's ability to function in areas related to work family relationships social relationships intimate partner relationships day to day self care or health behaviors meaningful activities. Ongoing psychotherapy is necessary to provide counseling and improve functioning with daily activities.    DSM-5 Diagnosis:  Persistent Depressive Disorder, with intermittent major depressive episodes, with current mild episode  Generalized Anxiety Disorder     Plan:  1. Follow up with this provider 8/16/2021 @ 2:00 PM in clinic  2. Primary care appointment 8/11/2021 @ 1:30 in clinic  3. Utilize crisis resources as needed.  4. After Visit Summary will be reviewed in Luis Cline Psy.D.  they/them/theirs  Primary Care Behavioral Health Fellow      NOTE: Treatment plan update due 11/2/2021.  Diagnostic assessment update due 4/12/2022.    Treatment Plan    Client's Legal Name: Ness Alamo    Client's Preferred Name: Gabby  Date Of Birth: " "1995  Today's Date: August 2, 2021    Next Treatment Plan Update Due: 11/2/2021  Next Diagnostic Update Due: 4/12/2022    DSM-V Diagnoses:   Persistent Depressive Disorder, with intermittent major depressive episodes, with current mild episode  Generalized Anxiety Disorder    Psychosocial / Contextual Factors:   Gabby is a 25 year old second-generation [place]yanese lesbian female who is being seen for problematic symptoms of generalized anxiety, chronic depression, and poor body image.    Functioning:    Gabby's mental health concerns have been continuing to affect her ability to function in areas related to work family relationships social relationships intimate partner relationships day to day self care or health behaviors meaningful activities causing clinically significant distress.    PHQ 12/30/2020 3/29/2021 7/12/2021   PHQ-9 Total Score 14 20 13   Q9: Thoughts of better off dead/self-harm past 2 weeks Not at all Not at all Not at all      RICHELLE-7 SCORE 3/5/2020 12/30/2020 7/12/2021   Total Score 15 3 6     Collaboration:  Primary Care Provider: Pal Buckley    Referral:  none    Anticipated treatment duration: Unknown  Agreed upon meeting frequency: Weekly    Long Term Treatment Goal(s) related to diagnosis / functional impairment(s):    Goal 1: Gabby will experience fewer depressive symptoms \"I want to not feel like everything is too much work\"    Steps we will take to achieve your goal:    Patient will learn to identify, monitor, challenge, and change problematic patterns of behavior and thought that contribute to suffering.    Intervention(s):  Therapist will provide support, psychoeducation and homework assignments as needed.    Goal 2: Gabby wants to improve her self confidence and be more engaged in life.    Steps we will take to achieve your goal:    Patient will learn to identify and clarify personal values and create goals that promote values-consistent behavior in all dimensions of wellness: " physical, emotional/mental, social, occupational/educational, financial, and spiritual.    Intervention(s):  Therapist will provide support, psychoeducation and homework assignments as needed.    If you need additional support and care during times that your therapist or PCP are not available, here are some additional resources for you:    [facility] Adult Crisis:  [phone]  [facility] Multilingual Crisis Line:  [phone]  [facility] Adult Crisis: [phone]    Crisis Text Line: Text [place] to 884592. Free support at your fingertips 24/7  People who text [place] to 509003 will be connected with a counselor. Crisis Text Line is available 24 hours a day, seven days a week.    You can also consider going to the [facility] at the following address.  Walk ins are welcome:    [address] [place] [address]   [phone] (for 24 hour crisis consultation)    Monday - Friday 8:00am - 7:00pm  Saturday:  11:00am - 3:00pm  Sunday and Holidays Closed    If you feel at risk of immediate harm, go directly to the Emergency Department.    Patient  has reviewed and agreed to the above plan.    Denia Cline PsyD  August 2, 2021

## 2021-08-02 NOTE — TELEPHONE ENCOUNTER
Contacted Gabby by phone for outreach after being unable to connect with her for video counseling appointment 7/29/2021 @ 11:30 AM.     Spoke with patient who stated that she forgot about the appointment. She asked to reschedule with this provider and have a consistent time moving forward to help her remember appointment.     We agreed to try to schedule Monday afternoons to be a bit more consistent. She denied any acute mental health concerns or questions and thanked this provider for the call.    PLAN:  Patient was scheduled with this provider 8/2/2021 @ 4:00 PM via video; and 8/16/2021 @ 2:00 PM via video    Denia Cline Psy.D.  they/them/theirs  Primary Care Behavioral Health Fellow

## 2021-08-02 NOTE — PATIENT INSTRUCTIONS
It was a pleasure to work with you today Xiem!    The homework and goals we discussed today:  1) Consider, what would it look like if I was more engage in my life? What would I be doing? It is okay to want to be happy; however, happiness is an emotion that like all emotions will come and go. What are the types of situations where you are more likely to be happy?    Our next scheduled appointment(s): 8/16/2021 @ 2:00 PM in clinic  Primary care appointment 8/11/2021 @ 1:30 in clinic    If you need to change a future appointment for any reason, the most efficient way to do so is to call the  at [phone].    Denia Cline Psy.D.  they/them/theirs  Primary Care Behavioral Health Fellow  ^^^^^^^^^^^^^^^^^^^^^^^^^^^  Your safety as well as the safety of those around you is important to us. Should you or someone else be in need of them, here are some additional resources:    Feeling overwhelmed and just need a supportive person to talk through a struggling time? (hours 12 PM - 10 PM CST)  Toll Free [phone] or text  Support  to 22740  The [place] Warmline provides a lgka-nn-juye approach to mental health recovery, support and wellness. Calls are answered by professionally trained Certified Peer Specialists, who have first hand experience living with a mental health condition. (hours 12 PM - 10 PM CST)    Do you feel like you might be in crisis and potentially need professional services?   [facility] Adult Mental Health Crisis:  [phone]  [facility] Childrens Mental Health Crisis: [phone]    [facility] Adult Mental Health Crisis: [phone]  [facility] Childrens Mental Health Crisis: [phone]    [facility] Multilingual Crisis Line:  [phone]     Crisis Text Line: People who text [place] to 337321 will be connected with a counselor.     [place] Domestic Violence Crisis Line (24-hour [place] crisis line) 1-675.116.5518    Find a local Alcohol or Narcotics Anonymous  "meeting:  Https://aam[place].org/  https://www.nam[place].org/    If you feel at risk of immediate harm, call 9-1-1 or go directly to the Emergency Department.  ^^^^^^^^^^^^^^^^^^^^^^^^^^^^^^        Treatment Plan    Client's Legal Name: Ness Alamo    Client's Preferred Name: Gabby  YOB: 1995  Today's Date: August 2, 2021    Next Treatment Plan Update Due: 11/2/2021  Next Diagnostic Update Due: 4/12/2022    DSM-V Diagnoses:   Persistent Depressive Disorder, with intermittent major depressive episodes, with current mild episode  Generalized Anxiety Disorder    Psychosocial / Contextual Factors:   Gabby is a 25 year old second-generation [place] lesbian female who is being seen for problematic symptoms of generalized anxiety, chronic depression, and poor body image.    Functioning:    Gabby's mental health concerns have been continuing to affect her ability to function in areas related to work family relationships social relationships intimate partner relationships day to day self care or health behaviors meaningful activities causing clinically significant distress.    PHQ 12/30/2020 3/29/2021 7/12/2021   PHQ-9 Total Score 14 20 13   Q9: Thoughts of better off dead/self-harm past 2 weeks Not at all Not at all Not at all      RICEHLLE-7 SCORE 3/5/2020 12/30/2020 7/12/2021   Total Score 15 3 6     Collaboration:  Primary Care Provider: Pal Buckley    Referral:  none    Anticipated treatment duration: Unknown  Agreed upon meeting frequency: Weekly    Long Term Treatment Goal(s) related to diagnosis / functional impairment(s):    Goal 1: Gabby will experience fewer depressive symptoms \"I want to not feel like everything is too much work\"    Steps we will take to achieve your goal:    Patient will learn to identify, monitor, challenge, and change problematic patterns of behavior and thought that contribute to suffering.    Intervention(s):  Therapist will provide support, psychoeducation and homework " assignments as needed.    Goal 2: Gabby wants to improve her self confidence and be more engaged in life.    Steps we will take to achieve your goal:    Patient will learn to identify and clarify personal values and create goals that promote values-consistent behavior in all dimensions of wellness: physical, emotional/mental, social, occupational/educational, financial, and spiritual.    Intervention(s):  Therapist will provide support, psychoeducation and homework assignments as needed.    If you need additional support and care during times that your therapist or PCP are not available, here are some additional resources for you:    [facility] Adult Crisis:  [phone]  [facility] Multilingual Crisis Line:  [phone]  [facility] Adult Crisis: [phone]    Crisis Text [place]: Text [place] to 462831. Free support at your fingertips 24/7  People who text [place] to 711236 will be connected with a counselor. Crisis Text Line is available 24 hours a day, seven days a week.    You can also consider going to the [facility] at the following address.  Walk ins are welcome:    [address] [place] [address]   [phone] (for 24 hour crisis consultation)    Monday - Friday 8:00am - 7:00pm  Saturday:  11:00am - 3:00pm  Sunday and Holidays Closed    If you feel at risk of immediate harm, go directly to the Emergency Department.    Patient  has reviewed and agreed to the above plan.    Denia Cline PsyD  August 2, 2021

## 2021-08-11 ENCOUNTER — OFFICE VISIT (OUTPATIENT)
Dept: FAMILY MEDICINE | Facility: CLINIC | Age: 26
End: 2021-08-11
Payer: COMMERCIAL

## 2021-08-11 VITALS
DIASTOLIC BLOOD PRESSURE: 77 MMHG | WEIGHT: 179.2 LBS | SYSTOLIC BLOOD PRESSURE: 111 MMHG | RESPIRATION RATE: 20 BRPM | TEMPERATURE: 98.2 F | HEART RATE: 74 BPM | BODY MASS INDEX: 30.76 KG/M2 | OXYGEN SATURATION: 95 %

## 2021-08-11 DIAGNOSIS — Z30.09 ENCOUNTER FOR OTHER GENERAL COUNSELING OR ADVICE ON CONTRACEPTION: Primary | ICD-10-CM

## 2021-08-11 DIAGNOSIS — G47.00 INSOMNIA, UNSPECIFIED TYPE: ICD-10-CM

## 2021-08-11 DIAGNOSIS — F32.1 CURRENT MODERATE EPISODE OF MAJOR DEPRESSIVE DISORDER, UNSPECIFIED WHETHER RECURRENT (H): ICD-10-CM

## 2021-08-11 DIAGNOSIS — F41.1 GENERALIZED ANXIETY DISORDER: ICD-10-CM

## 2021-08-11 DIAGNOSIS — M53.3 PAIN IN THE COCCYX: ICD-10-CM

## 2021-08-11 LAB — HCG UR QL: NEGATIVE

## 2021-08-11 PROCEDURE — 99214 OFFICE O/P EST MOD 30 MIN: CPT | Mod: GC

## 2021-08-11 PROCEDURE — 81025 URINE PREGNANCY TEST: CPT

## 2021-08-11 RX ORDER — DULOXETIN HYDROCHLORIDE 30 MG/1
30 CAPSULE, DELAYED RELEASE ORAL DAILY
Qty: 30 CAPSULE | Refills: 0 | Status: SHIPPED | OUTPATIENT
Start: 2021-08-11 | End: 2021-09-17

## 2021-08-11 RX ORDER — ESCITALOPRAM OXALATE 10 MG/1
10 TABLET ORAL DAILY
Qty: 7 TABLET | Refills: 0 | Status: SHIPPED | OUTPATIENT
Start: 2021-08-11 | End: 2021-09-17 | Stop reason: ALTCHOICE

## 2021-08-11 ASSESSMENT — PATIENT HEALTH QUESTIONNAIRE - PHQ9
5. POOR APPETITE OR OVEREATING: NEARLY EVERY DAY
SUM OF ALL RESPONSES TO PHQ QUESTIONS 1-9: 18

## 2021-08-11 ASSESSMENT — ANXIETY QUESTIONNAIRES
1. FEELING NERVOUS, ANXIOUS, OR ON EDGE: SEVERAL DAYS
6. BECOMING EASILY ANNOYED OR IRRITABLE: MORE THAN HALF THE DAYS
GAD7 TOTAL SCORE: 16
7. FEELING AFRAID AS IF SOMETHING AWFUL MIGHT HAPPEN: NEARLY EVERY DAY
5. BEING SO RESTLESS THAT IT IS HARD TO SIT STILL: NEARLY EVERY DAY
2. NOT BEING ABLE TO STOP OR CONTROL WORRYING: MORE THAN HALF THE DAYS
3. WORRYING TOO MUCH ABOUT DIFFERENT THINGS: MORE THAN HALF THE DAYS

## 2021-08-11 NOTE — PROGRESS NOTES
Assessment & Plan     Contraceptive management  Interested in Nexplanon for management of menorrhagia. Stopped taking oral OCPs awhile ago because they were not improving her bleeding. Urine pregnancy test neg today. Will repeat in 2 wks prior to nexplanon insertion.   - HCG qualitative urine negative   - Repeat urine HCG in 2 weeks with Nexplanon insertion if negative     Generalized anxiety disorder  Current moderate episode of major depressive disorder, unspecified whether recurrent (H)  On lexapro 20 mg for about 6 months without improvement in symptoms. Still having depressive mood, fatigue, and loss of interest. Today, PHQ9 score of 18 (13 a month ago) and GAD7 score of 16 (6 one month ago). Tried zoloft and prozac in the past. Would like to try a new medication.  Will taper down lexapro and then start duloxetine. No SI/HI or AVH. No symptoms concerning for ugo.  - Stop lexapro 20 mg PO daily  - Take escitalopram (LEXAPRO) 10 MG tablet for 7 days then discontinue  - After discontinuing lexapro, start DULoxetine (CYMBALTA) 30 MG capsule PO daily  - Consider increasing duloxetine dose to 60 mg PO daily at 2 week follow-up visit     Insomnia, unspecified type  Trouble falling asleep likely secondary to poor sleep hygiene and anxiety. Stopped taking melatonin due to vivid dreams but did improve her sleep. Previous consult to sleep medicine, sleep apnea was ruled out. Talked about decreasing daytime napping and electronic use at night. Decreasing anxiety will likely help improve her sleep.  -Start OTC melatonin at night     Pain in the coccyx  Fell on coccyx one year ago. No weakness/numbness/tingling. Patient willing to try heat/ice and ibuprofen for pain.     FUTURE APPOINTMENTS:       - Follow-up visit in 2 weeks for Nexplanon placement and Duloxetine med check     Return in about 2 weeks (around 8/25/2021) for nexplanon, med check.    Pal Buckley MD  [facility]  "  Gabby is a 25 year old who presents for the following health issues.    HPI   Depression: Patient has been continuing to feel depressed and fatigued. Also endorsing loss of interest in activities. She sees behavioral health at [facility]. She has been on Lexapro 20 mg daily for 6 months and states it is not working. Denies side effects. Has tried Prozac, Zoloft, and possibly bupropion in the past. Would like to try a new medication today. No signs or symptoms concerning for ugo. Denies SI, HI and AVH.     Insomnia: Patient has trouble falling and staying asleep. Gets in bed around 11 or 12 pm. Usually feels anxious and is on her phone. Takes her 1-2 hours to fall asleep and wakes up throughout the night. Gets 4-6 hours of broken sleep per night. Will sometimes take long naps during the day. Has had a referral to sleep medicine. Per patient, sleep apnea was ruled out.     Contraception: Would like to get Nexplanon to help with heavy periods. Stopped taking OCPs a couple months ago because it was not decreasing her bleeding. Ibuprofen helps with her cramping. States there is no chance she could be pregnant.      Coccyx pain: Patient has had tailbone pain since falling down the stairs one year ago. States the pain is dull and 6 out of 10 in severity when sitting down, then briefly sharp when she stands up. Initially tried heat/ice one year ago. Has not been taking any pain medication. Uses a \"donut\" pillow.       Review of Systems   CONSTITUTIONAL:NEGATIVE for fever, chills, change in weight  RESP:NEGATIVE for significant cough or SOB  CV: NEGATIVE for chest pain, palpitations or peripheral edema and NEGATIVE for chest pain/chest pressure  GI: NEGATIVE for nausea, abdominal pain, and POSITIVE for occassional diarrhea  : POSITIVE for heavy menstrual periods  NEURO: NEGATIVE for headaches  PSYCHIATRIC: POSITIVE for anxiety, fatigue and depressed mood      Objective    /77 (BP Location: Left arm, Cuff Size: Adult " Small)   Pulse 74   Temp 98.2  F (36.8  C) (Oral)   Resp 20   Wt 81.3 kg (179 lb 3.2 oz)   LMP 07/06/2021 (Approximate)   SpO2 95%   BMI 30.76 kg/m    Body mass index is 30.76 kg/m .  Physical Exam   GENERAL: healthy, alert and no distress  RESP: lungs clear to auscultation - no rales, rhonchi or wheezes  CV: regular rate and rhythm, normal S1 S2, no S3 or S4, no murmur, click or rub, no peripheral edema and peripheral pulses strong  MS: no gross musculoskeletal defects noted, no edema  SKIN: no suspicious lesions or rashes  NEURO: Normal strength and tone, mentation intact and speech normal  PSYCH: mentation appears normal, affect normal/bright    Ana Buckley MD PGY1  [facility]  08/11/21    I precepted today with Dr. Donn Cooper.

## 2021-08-11 NOTE — PROGRESS NOTES
Preceptor Attestation:    I discussed the patient with the resident and evaluated the patient in person. I have verified the content of the note, which accurately reflects my assessment of the patient and the plan of care.   Supervising Physician:  Donn Cooper MD.  PHQ 3/29/2021 7/12/2021 8/11/2021   PHQ-9 Total Score 20 13 18   Q9: Thoughts of better off dead/self-harm past 2 weeks Not at all Not at all Not at all     RICHELLE-7 SCORE 12/30/2020 7/12/2021 8/11/2021   Total Score 3 6 16

## 2021-08-12 ASSESSMENT — ANXIETY QUESTIONNAIRES: GAD7 TOTAL SCORE: 16

## 2021-08-16 ENCOUNTER — OFFICE VISIT (OUTPATIENT)
Dept: PSYCHOLOGY | Facility: CLINIC | Age: 26
End: 2021-08-16
Payer: COMMERCIAL

## 2021-08-16 DIAGNOSIS — F34.1 PERSISTENT DEPRESSIVE DISORDER: Primary | Chronic | ICD-10-CM

## 2021-08-16 DIAGNOSIS — F41.1 GENERALIZED ANXIETY DISORDER: ICD-10-CM

## 2021-08-16 PROCEDURE — 90837 PSYTX W PT 60 MINUTES: CPT | Mod: U7 | Performed by: STUDENT IN AN ORGANIZED HEALTH CARE EDUCATION/TRAINING PROGRAM

## 2021-08-16 NOTE — PROGRESS NOTES
"Behavioral Health Progress Note    Client's Legal Name: Ness Alamo    Client's Preferred Name: Gabby  YOB: 1995  Type of Service: in-person and counseling  Length of Service:   Start time: 2:04 PM  End time: 2:58 PM  Duration: 54 minutes  Attendees: patient    Identifying Information and Presenting Problem:  Gabby is a 25 year old second-generation Bahamian lesbian female who is being seen for problematic symptoms of generalized anxiety, chronic depression, insomnia, and poor body image.     Treatment Objective(s) Addressed in This Session:  Goal 1: Gabby will experience fewer depressive symptoms \"I want to not feel like everything is too much work\"  Goal 2: Gabby wants to improve her self confidence and be more engaged in life.    Progress on / Status of Treatment Objective(s) / Homework:  Stable     Patient Health Questionnaire - 9:  PHQ 3/29/2021 7/12/2021 8/11/2021   PHQ-9 Total Score 20 13 18   Q9: Thoughts of better off dead/self-harm past 2 weeks Not at all Not at all Not at all      Generalized Anxiety Disorder - 7:  RICHELLE-7 SCORE 12/30/2020 7/12/2021 8/11/2021   Total Score 3 6 16     Topics Discussed/Interventions Provided:  Solution focused/person centered discussion continued topic from last session where we reviewed Gabby's treatment plan and therapy goals. Gabby and her primary care physician have decided to make a change to depression medication at last PCP appointment. Gabby is hoping this will help her better manage mood symptoms. We spoke about her therapy goals, specifically the pros and cons of making behavior based therapy goals. Provider gave some evidence on satisfaction with life and values based living, these both being items that are difficult for Gabby. \"I don't know what I want, I just know that I am not happy.\" We agreed to focus more in the next few sessions on values clarification.    Assessment:   The patient appeared to be active and engaged in today's session and was " "receptive to feedback. No acute safety concerns.    Mental Status:   During interaction with the examiner today, Xiem was cooperative, open, engaged and pleasant. Patient was generally alert and oriented to person, place, time, and situation. They were casually dressed and appropriately groomed. Patient's attire was appropriate for the weather and occasion. Eye contact was avoidant; psychomotor functioning  restless. Speech was normal limits tone, rate, volume; largely coherent and relevant to topic. Mood was \"okay\", depressed and sad; affect was normal. Thought processes were unremarkable. Thought content was not remarkable with no evidence of psychotic features and no evidence of suicide, homicide, or nonsuicidal self injury related thoughts, intent, urges, planning, behavior/recent attempts. Memory appeared grossly intact without being formally evaluated. Insight appeared fair and judgment adequate.  Patient exhibited good impulse control during the appointment.     Does the patient appear to be at imminent risk of harm to self/others at this time? No    The session was necessary to address symptoms of depression, anxiety, and poor body image that have been interfering with patient's ability to function in areas related to work family relationships social relationships intimate partner relationships day to day self care or health behaviors meaningful activities. Ongoing psychotherapy is necessary to provide counseling and improve functioning with daily activities.     DSM-5 Diagnosis:  Persistent Depressive Disorder, with intermittent major depressive episodes, with current mild episode  Generalized Anxiety Disorder    Plan:  1. Follow up 8/23/2021 @ 2:00 PM in clinic  2. Medical Follow up with Dr. Mckay 8/25/2021 @ 2:30 PM in clinic  3. Work on goals as noted in patient instructions, Utilize crisis resources as needed.  4. After Visit Summary will be reviewed in MyChart, printed a copy of ACT Values Bulleye " Worksheet for patient    Denia Cline Psy.D.  they/them/theirs  Primary Care Behavioral Health Fellow    NOTE: Treatment plan update due 11/2/2021.  Diagnostic assessment update due 4/12/2022.

## 2021-08-16 NOTE — PATIENT INSTRUCTIONS
It was a pleasure to work with you today Xiem!    The homework and goals we discussed today:  1) Complete ACT Bulleye/Values Exploration Worksheet and bring to next therapy session    Our next scheduled appointment(s): 8/23/2021 @ 2:00 PM in clinic  Medical Follow up with Dr. Mckay 8/25/2021 @ 2:30 PM in clinic    If you need to change a future appointment for any reason, the most efficient way to do so is to call the  at [phone].    Denia Cline Psy.D.  they/them/theirs  Primary Care Behavioral Health Fellow  ^^^^^^^^^^^^^^^^^^^^^^^^^^^  Your safety as well as the safety of those around you is important to us. Should you or someone else be in need of them, here are some additional resources:    Feeling overwhelmed and just need a supportive person to talk through a struggling time? (hours 12 PM - 10 PM CST)  Toll Free [phone] or text  Support  to 64884  The [place] Warmline provides a ryyj-zv-lvlw approach to mental health recovery, support and wellness. Calls are answered by professionally trained Certified Peer Specialists, who have first hand experience living with a mental health condition. (hours 12 PM - 10 PM CST)    Do you feel like you might be in crisis and potentially need professional services?   [facility] Adult Mental Health Crisis:  [phone]  [facility] Mental Health Crisis: [phone]    [facility] Adult Mental Health Crisis: [phone]  [facility] Mental Health Crisis: [phone]    [facility] Multilingual Crisis Line:  [phone]     Crisis Text Line: People who text [place] to 266154 will be connected with a counselor.     [place] Domestic Violence Crisis Line (24-hour [place] crisis line) 1-144.716.9020    Find a local Alcohol or Narcotics Anonymous meeting:  Https://aam[place].org/  https://www.nam[place].org/    If you feel at risk of immediate harm, call 9-1-1 or go directly to the Emergency  Department.  ^^^^^^^^^^^^^^^^^^^^^^^^^^^^^^

## 2021-08-20 NOTE — PROGRESS NOTES
Assessment & Plan     Encounter for other general counseling or advice on contraception  Insertion of implantable subdermal contraceptive  Nexplanon in place  Interested in nexplanon for contraception but mainly for the side effect of amenorrhea. Discussed that this isn't ususally the primary purpose for nexplanon but she still wanted this placed. Risk vs benefit reviewed and negative pregnancy test. Placed without issue (see procedure note).   - HCG qualitative urine  - etonogestrel (NEXPLANON) subdermal implant 68 mg  - INSERTION NON-BIODEGRADABLE DRUG DELIVERY IMPLANT    Current moderate episode of major depressive disorder, unspecified whether recurrent (H)  Generalized anxiety disorder  Mood and anxiety mildly improved from last visit after transitioning from lexapro to cymbalta. She is currently taking 30 mg with room to increase dose but wants to stay at current dose today.     Pain in the coccyx  Secondary to a fall down cement stairs >1 year ago. Using donut seat and conservative measures (ice, heat, tylenol, ibupfrofen) but still in significant pain. If significantly broken could consider ortho consult.   - XR Sacrum and Coccyx 2 Views      Ordering of each unique test  Prescription drug management  45 minutes spent on the date of the encounter doing chart review, history and exam, documentation and further activities per the note    See Patient Instructions    Return in about 3 months (around 11/25/2021).    Nell Mckay MD PGY3  [facility]    Cristian Pierre is a 25 year old who presents for the following health issues:     HPI   1. Birth control- Interested in Nexplanon for management of menorrhagia. Stopped taking oral OCPs awhile ago because they were not improving her bleeding. Negative pregnancy test 2 weeks ago and today. Sexually active with one female partner currently.   2. Anxiety/depression- previously on lexapro and not effective. Primary sympoms were  depressive mood, fatigue, and loss of interest. Tapered off lexapro and started duloxetine at last visit. She is tolerating this without side effects. Feels like she has more energy. Does not want to increase dose today.   3. Insomnia- started on melatonin and discussed sleep hygiene at last visit. Sleep has been ok- not sleeping during the middle of the day like she used to.   4. Tailbone pain- fell down the stairs about 1 year ago x2. Pain is there all the time when sitting- not when standing. Tried ibuprofen/tylenol. Using donut pillow. No weakness/numbness/tingling. Tried heat/ice and ibuprofen. Using donut pillow at home.       Review of Systems   Constitutional, HEENT, cardiovascular, pulmonary, gi and gu systems are negative, except as otherwise noted.      Objective    /77 (BP Location: Right arm, Cuff Size: Adult Small)   Pulse 88   Temp 98.1  F (36.7  C) (Oral)   Resp 12   Wt 80.4 kg (177 lb 3.2 oz)   LMP 08/11/2021 (Approximate)   SpO2 97%   Breastfeeding No   BMI 30.42 kg/m    Body mass index is 30.42 kg/m .  Physical Exam   GENERAL: healthy, alert and no distress  RESP: lungs clear to auscultation - no rales, rhonchi or wheezes  CV: regular rate and rhythm, normal S1 S2, no S3 or S4, no murmur, click or rub, no peripheral edema and peripheral pulses strong  MS: no gross musculoskeletal defects noted, no edema  PSYCH: mentation appears normal, affect normal/bright    PHQ 7/12/2021 8/11/2021 8/25/2021   PHQ-9 Total Score 13 18 16   Q9: Thoughts of better off dead/self-harm past 2 weeks Not at all Not at all Not at all         Results for orders placed or performed in visit on 08/25/21 (from the past 24 hour(s))   HCG qualitative urine   Result Value Ref Range    hCG Urine Qualitative Negative Negative

## 2021-08-23 ENCOUNTER — OFFICE VISIT (OUTPATIENT)
Dept: PSYCHOLOGY | Facility: CLINIC | Age: 26
End: 2021-08-23
Payer: COMMERCIAL

## 2021-08-23 DIAGNOSIS — F41.1 GENERALIZED ANXIETY DISORDER: ICD-10-CM

## 2021-08-23 DIAGNOSIS — F34.1 PERSISTENT DEPRESSIVE DISORDER: Primary | ICD-10-CM

## 2021-08-23 PROCEDURE — 90834 PSYTX W PT 45 MINUTES: CPT | Mod: U7 | Performed by: STUDENT IN AN ORGANIZED HEALTH CARE EDUCATION/TRAINING PROGRAM

## 2021-08-23 NOTE — PATIENT INSTRUCTIONS
It was a pleasure to work with you today Xiem!    The homework and goals we discussed today:  1) continue to refine your values bullseye - values are one word or short phrases.    2) might consider watching https://www.youhCentiveube.com/watch?v=[email or web address]    3) also something that came to mind as part of our discussion today: https://www.[employer]/depression-through-art/?utm_source=google&utm_medium=organic&utm_campaign=organic    Our next scheduled appointment(s): 9/7/2021 @ 11:00 AM in clinic; 9/14/2021 @ 2021 @ 11:00 AM in clinic    If you need to change a future appointment for any reason, the most efficient way to do so is to call the  at [phone].    Denia Cline Psy.D.  they/them/theirs  Primary Care Behavioral Health Fellow  ^^^^^^^^^^^^^^^^^^^^^^^^^^^  Your safety as well as the safety of those around you is important to us. Should you or someone else be in need of them, here are some additional resources:    Feeling overwhelmed and just need a supportive person to talk through a struggling time? (hours 12 PM - 10 PM CST)  Toll Free [phone] or text  Support  to 79413  The [place] Warmline provides a haan-ez-pyrj approach to mental health recovery, support and wellness. Calls are answered by professionally trained Certified Peer Specialists, who have first hand experience living with a mental health condition. (hours 12 PM - 10 PM CST)    Do you feel like you might be in crisis and potentially need professional services?   [facility] Adult Mental Health Crisis:  [phone]  [facility] Mental Health Crisis: [phone]    [facility] [place] Adult Mental Health Crisis: [phone]  [facility] [place] [facility] Mental Health Crisis: [phone]    [facility] Multilingual Crisis Line:  [phone]     Crisis Text Line: People who text [place] to 767547 will be connected with a counselor.     [place] Domestic Violence Crisis Line (24-hour [place] crisis line)  2-332-215-2954    Find a local Alcohol or Narcotics Anonymous meeting:  Https://aam[place].org/  https://www.nam[place].org/    If you feel at risk of immediate harm, call 9-1-1 or go directly to the Emergency Department.  ^^^^^^^^^^^^^^^^^^^^^^^^^^^^^^

## 2021-08-23 NOTE — Clinical Note
Dr. Buckley, MARC, Gabby seems to be doing well so far on change in medication and is feeling good about her care with you. Nice job! - Denia

## 2021-08-23 NOTE — PROGRESS NOTES
"Behavioral Health Progress Note    Client's Legal Name: Ness Alamo    Client's Preferred Name: Gabby  YOB: 1995  Type of Service: in-person and counseling  Length of Service:   Start time: 3:05 PM    End time: 3:57 PM   Duration: 52 minutes  Attendees: patient    Identifying Information and Presenting Problem:  Gabby is a 25 year old second-generation Korean lesbian female who is being seen for symptoms of chronic depressed mood, hypersomnia, generalized malaise and generalized depression.    Treatment Objective(s) Addressed in This Session:  Goal 1: Gabby will experience fewer depressive symptoms \"I want to not feel like everything is too much work\"  Goal 2: Gabby wants to improve her self confidence and be more engaged in life.    Progress on / Status of Treatment Objective(s) / Homework:  Stable     Patient Health Questionnaire - 9:  PHQ 3/29/2021 7/12/2021 8/11/2021   PHQ-9 Total Score 20 13 18   Q9: Thoughts of better off dead/self-harm past 2 weeks Not at all Not at all Not at all      Generalized Anxiety Disorder - 7:  RICHELLE-7 SCORE 12/30/2020 7/12/2021 8/11/2021   Total Score 3 6 16     Topics Discussed/Interventions Provided:  Provided acceptance and commitment based therapy. We discussed the concepts of acceptance and value's congruent living as we reviewed Gabby's experience of the Values Valeriy homework from last session. She remarked that it was easier for her to identify values/goals in some areas like relationships while being more difficult in other areas. Generally, she noted being overwhelmed and saddened by the realization that her behavior does not routinely reflect her various values. Otherwise, Gabby shared that she does feel somewhat less sleepy during the day which she attributes to change in medication. Provider gave some general education at Gabby's request about the differences between the new medication and older medication. Provider gave some education about depression " "vis-à-vis the interpersonal psychotherapy medical model of depression and the \"sick role\".     Gabby described that this conceptualization of depression was helpful in making her feel more capable in dealing with her depression. She contrasted this with trying to escape from depressed mood by sleeping a lot or living in her head.  We explored how clarifying her values and using person first language can help with recovering from depression.     Assessment:   The patient appeared to be active and engaged in today's session and was receptive to feedback. Gabby was more engaged in today's session relative to previous session. She responded well to discussion today about \"the sick role\" and values clarification which is a positive change from previous existential depression that she had expressed previously. No acute safety concerns were noted.    Mental Status:   During interaction with the examiner today, Gabby was cooperative, open and engaged. Patient was generally alert and oriented to person, place, time, and situation. They were casually dressed and appropriately groomed. Patient's attire was appropriate for the weather and occasion. Eye contact was adequate; psychomotor functioning  normal or unremarkable. Speech was normal limits tone, rate, volume; largely coherent and relevant to topic. Mood was \"okay\" and euthymic; affect was normal. Thought processes were unremarkable. Thought content was not remarkable with no evidence of psychotic features and no evidence of suicide, homicide, or nonsuicidal self injury related thoughts, intent, urges, planning, behavior/recent attempts. Memory appeared grossly intact without being formally evaluated. Insight appeared adequate and judgment adequate.  Patient exhibited good impulse control during the appointment.     Does the patient appear to be at imminent risk of harm to self/others at this time? No    The session was necessary to address symptoms of depression, anxiety, " and poor body image that have been interfering with patient's ability to function in areas related to work family relationships social relationships intimate partner relationships day to day self care or health behaviors meaningful activities. Ongoing psychotherapy is necessary to provide counseling and improve functioning with daily activities.     DSM-5 Diagnosis:  Persistent Depressive Disorder, with intermittent major depressive episodes, with current mild episode  Generalized Anxiety Disorder    Plan:  1. Follow up with this provider 9/7/2021 @ 11:00 AM in clinic; 9/14/2021 @ 11:00 AM in clinic  2. Patient has a follow up medical appointment with Dr. Mckay 8/25/2021 @ 2:30 PM in clinic  3. Work on goals as noted in patient instructions. Utilize crisis resources as needed.  4. After Visit Summary will be reviewed in Luis Cline Psy.D.  they/them/theirs  Primary Care Behavioral Health Fellow    NOTE: Treatment plan update due 11/2/2021.  Diagnostic assessment update due 4/12/2022.

## 2021-08-25 ENCOUNTER — ANCILLARY PROCEDURE (OUTPATIENT)
Dept: GENERAL RADIOLOGY | Facility: CLINIC | Age: 26
End: 2021-08-25
Payer: COMMERCIAL

## 2021-08-25 ENCOUNTER — OFFICE VISIT (OUTPATIENT)
Dept: FAMILY MEDICINE | Facility: CLINIC | Age: 26
End: 2021-08-25
Payer: COMMERCIAL

## 2021-08-25 VITALS
RESPIRATION RATE: 12 BRPM | BODY MASS INDEX: 30.42 KG/M2 | TEMPERATURE: 98.1 F | OXYGEN SATURATION: 97 % | HEART RATE: 88 BPM | DIASTOLIC BLOOD PRESSURE: 77 MMHG | WEIGHT: 177.2 LBS | SYSTOLIC BLOOD PRESSURE: 115 MMHG

## 2021-08-25 DIAGNOSIS — F32.1 CURRENT MODERATE EPISODE OF MAJOR DEPRESSIVE DISORDER, UNSPECIFIED WHETHER RECURRENT (H): ICD-10-CM

## 2021-08-25 DIAGNOSIS — Z30.017 INSERTION OF IMPLANTABLE SUBDERMAL CONTRACEPTIVE: ICD-10-CM

## 2021-08-25 DIAGNOSIS — Z97.5 NEXPLANON IN PLACE: ICD-10-CM

## 2021-08-25 DIAGNOSIS — F41.1 GENERALIZED ANXIETY DISORDER: ICD-10-CM

## 2021-08-25 DIAGNOSIS — M53.3 PAIN IN THE COCCYX: ICD-10-CM

## 2021-08-25 DIAGNOSIS — Z30.09 ENCOUNTER FOR OTHER GENERAL COUNSELING OR ADVICE ON CONTRACEPTION: Primary | ICD-10-CM

## 2021-08-25 LAB — HCG UR QL: NEGATIVE

## 2021-08-25 PROCEDURE — 72220 X-RAY EXAM SACRUM TAILBONE: CPT | Mod: FY | Performed by: RADIOLOGY

## 2021-08-25 PROCEDURE — 11981 INSERTION DRUG DLVR IMPLANT: CPT | Mod: GC | Performed by: STUDENT IN AN ORGANIZED HEALTH CARE EDUCATION/TRAINING PROGRAM

## 2021-08-25 PROCEDURE — 99213 OFFICE O/P EST LOW 20 MIN: CPT | Mod: 25 | Performed by: STUDENT IN AN ORGANIZED HEALTH CARE EDUCATION/TRAINING PROGRAM

## 2021-08-25 PROCEDURE — 81025 URINE PREGNANCY TEST: CPT | Performed by: STUDENT IN AN ORGANIZED HEALTH CARE EDUCATION/TRAINING PROGRAM

## 2021-08-25 ASSESSMENT — ANXIETY QUESTIONNAIRES
6. BECOMING EASILY ANNOYED OR IRRITABLE: SEVERAL DAYS
7. FEELING AFRAID AS IF SOMETHING AWFUL MIGHT HAPPEN: SEVERAL DAYS
IF YOU CHECKED OFF ANY PROBLEMS ON THIS QUESTIONNAIRE, HOW DIFFICULT HAVE THESE PROBLEMS MADE IT FOR YOU TO DO YOUR WORK, TAKE CARE OF THINGS AT HOME, OR GET ALONG WITH OTHER PEOPLE: SOMEWHAT DIFFICULT
2. NOT BEING ABLE TO STOP OR CONTROL WORRYING: SEVERAL DAYS
5. BEING SO RESTLESS THAT IT IS HARD TO SIT STILL: MORE THAN HALF THE DAYS
3. WORRYING TOO MUCH ABOUT DIFFERENT THINGS: SEVERAL DAYS
1. FEELING NERVOUS, ANXIOUS, OR ON EDGE: SEVERAL DAYS
GAD7 TOTAL SCORE: 10

## 2021-08-25 ASSESSMENT — PATIENT HEALTH QUESTIONNAIRE - PHQ9
5. POOR APPETITE OR OVEREATING: NEARLY EVERY DAY
SUM OF ALL RESPONSES TO PHQ QUESTIONS 1-9: 16

## 2021-08-25 NOTE — PATIENT INSTRUCTIONS
Keep pressure dressing on for 24 hours. Then let steri-strips fall off on their own.   Tylenol/ibuprofen for pain.     Give the nexplanon 2-3 months to take effect on body.     X-ray today- I will call with results.

## 2021-08-25 NOTE — PROCEDURES
Procedure Note-Nexplanon Insertion    Ness Alamo is a patient of [facility] here for placement of etonogestrel implant (Nexplanon)    Indication:Contraception    Consent: Affirmation of informed consent was signed and scanned into the medical record. Risks, benefits and alternatives were discussed. Patient's questions were elicited and answered.   Procedure safety checklist was completed:  Yes  Time Out (Pause for the Cause) completed: Yes    Labs: UPT:  Negative  LMP:   Patient's last menstrual period was 08/11/2021 (approximate).       Previous Contraception:  oral contraceptives  Counseling:  Pt. counseled on potential side effects of  Nexplanon, including unpredictable spotting/bleeding and plan for removal/replacement of Nexplanon in 3 years or less.    Preoperative Diagnosis: Desires effective contraception  Postoperative Diagnosis: etonogestrel implant in place  Skin prep Betadine  Anesthesia 1% lidocaine    Technique:   Patient was placed supine with right arm exposed.  Luis was made 8-10cm above medial epicondial and a guiding luis 4 cm above the first.  Arm was prepped with betadine.Insertion point was anesthetized with 1% lidocaine 2mL. After stretching the skin with thumb and index finger around the insertion site.  Skin punctured with the tip of the needle inserted at 30 degrees and then lowered to horizontal position. While lifting the skin with the tip of the needle, slided the needle to it's full length. Applicator was then stabilized and the purple slider was unlocked by pushing it slightly down. Slider moved back completely until it stopped. Applicator was then removed.  Correct placement of the implant was confirmed by palpation in the patient's arm and visualizing the purple top of the obturator.  Insertion site was dressed with an adhesive covered by a pressure dressing.      User card and patient chart label filled out. User card  given to patient for record. Nexplanon added to  medication list     Lot# [ [id number] ]    EBL: minimal  Complications:  No  Tolerance:  Pt tolerated procedure well and was in stable condition.     Follow up: Pt was instructed to call if bleeding, severe pain or foul smell.     Follow up in 2-3 months.   Resident: Nell Mckay MD  Faculty: Dharmesh Coronado MD present for and supervised this entire procedure.

## 2021-08-26 ASSESSMENT — ANXIETY QUESTIONNAIRES: GAD7 TOTAL SCORE: 10

## 2021-08-26 NOTE — PROGRESS NOTES
Preceptor Attestation:    I discussed the patient with the resident and evaluated the patient in person. I have verified the content of the note, which accurately reflects my assessment of the patient and the plan of care.   Supervising Physician:  Dharmesh Coronado MD.

## 2021-09-02 NOTE — PROGRESS NOTES
Preceptor Attestation:   I was present for and supervised the entire procedure. I have verified the content of the note, which accurately reflects my assessment of the patient and the plan of care.   Supervising Physician:  Dharmesh Coronado MD.

## 2021-09-05 ENCOUNTER — HEALTH MAINTENANCE LETTER (OUTPATIENT)
Age: 26
End: 2021-09-05

## 2021-09-07 ENCOUNTER — OFFICE VISIT (OUTPATIENT)
Dept: PSYCHOLOGY | Facility: CLINIC | Age: 26
End: 2021-09-07
Payer: COMMERCIAL

## 2021-09-07 DIAGNOSIS — F41.1 GENERALIZED ANXIETY DISORDER: ICD-10-CM

## 2021-09-07 DIAGNOSIS — F34.1 PERSISTENT DEPRESSIVE DISORDER: Primary | ICD-10-CM

## 2021-09-07 PROCEDURE — 90834 PSYTX W PT 45 MINUTES: CPT | Mod: U7 | Performed by: STUDENT IN AN ORGANIZED HEALTH CARE EDUCATION/TRAINING PROGRAM

## 2021-09-07 NOTE — PROGRESS NOTES
"Behavioral Health Progress Note    Client's Legal Name: Ness Alamo    Client's Preferred Name: Gabby  YOB: 1995  Type of Service: in-person and counseling  Length of Service:   Start time: 11:05 AM    End time: 11:53 AM   Duration: 48 minutes  Attendees: patient    Identifying Information and Presenting Problem:  Gabby is a 25 year old second-generation Polish lesbian female who is being seen for symptoms of chronic depressed mood, hypersomnia, generalized malaise and generalized depression.     Treatment Objective(s) Addressed in This Session:  Goal 1: Gabby will experience fewer depressive symptoms \"I want to not feel like everything is too much work\"  Goal 2: Gabby wants to improve her self confidence and be more engaged in life.    Progress on / Status of Treatment Objective(s) / Homework:  Stable     Patient Health Questionnaire - 9:  PHQ 7/12/2021 8/11/2021 8/25/2021   PHQ-9 Total Score 13 18 16   Q9: Thoughts of better off dead/self-harm past 2 weeks Not at all Not at all Not at all      Generalized Anxiety Disorder - 7:  RICHELLE-7 SCORE 7/12/2021 8/11/2021 8/25/2021   Total Score 6 16 10     Topics Discussed/Interventions Provided:  Provided acceptance and commitment based therapy. Much of session was spent discussing the concept of doing what matters/creating a life worth living. Gabby went on a date with her girlfriend and had a really good period of creative productivity completing several art commissions. She has also been avoiding taking naps during the day and waking up earlier in the morning. Overall, these experiences were positive as Gabby reflected that she felt more engaged with parts of herself \"that are not depression.\" She reflected on previous therapy session's discussion of acceptance as something she struggles with vs who she is. Provider gave some psychoeducation on Andrew's engaged life and the concept of flow. We spent the remainder of session exploring how Gabby can work to " "develop a relationship with herself as an artist and a healthy human. Some ideas included making a behavioral goal to leave the house at least one time a day. Building in a creative break time to eat/walk around/sleep during bursts of creativity. 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    Assessment:   The patient appeared to be active and engaged in today's session and was receptive to feedback. No safety concerns noted.    Mental Status:   During interaction with the examiner today, Gabby was cooperative, open, engaged and pleasant. Patient was generally alert and oriented to person, place, time, and situation. They were casually dressed and appropriately groomed. Patient's attire was appropriate for the weather and occasion. Eye contact was good; psychomotor functioning  normal or unremarkable. Speech was normal limits tone, rate, volume; largely coherent and relevant to topic. Mood was \"good\" and cheerful; affect was normal. Thought processes were unremarkable. Thought content was not remarkable with no evidence of psychotic features and no evidence of suicide, homicide, or nonsuicidal self injury related thoughts, intent, urges, planning, behavior/recent attempts. Memory appeared grossly intact without being formally evaluated. Insight appeared good and judgment good.  Patient exhibited good impulse control during the appointment.     Does the patient appear to be at imminent risk of harm to self/others at this time? No    The session was necessary to address symptoms of depression, anxiety, and poor body image that have been interfering with patient's ability to function in areas related to work family relationships social relationships intimate partner relationships day to day self care or health behaviors meaningful activities. Ongoing psychotherapy is necessary to provide counseling and improve functioning with daily activities.     DSM-5 " Diagnosis:  Persistent Depressive Disorder, with intermittent major depressive episodes, with current mild episode  Generalized Anxiety Disorder     Plan:  1. Follow up with this provider 9/14/2021 @ 11:00 AM in clinic  2. Work on goals as noted in patient instructions.  3. Utilize crisis resources as needed.  4. After Visit Summary will be reviewed in Luis Cline Psy.D.  they/them/theirs  Primary Care Behavioral Health Fellow    NOTE: Treatment plan update due 11/2/2021.  Diagnostic assessment update due 4/12/2022.

## 2021-09-07 NOTE — PATIENT INSTRUCTIONS
It was a pleasure to work with you today Xiem!    The homework and goals we discussed today:  1) Leave the house 1x a day and avoid naps.  2) How can you support your creative self? What behaviors might be involved (things outers can see you doing) to take care of yourself.  3) Further reading if interested [email or web address]    Our next scheduled appointment(s): 9/14/2021 @ 11:00 AM in clinic    If you need to change a future appointment for any reason, the most efficient way to do so is to call the  at [phone].    Denia Cline Psy.D.  they/them/theirs  Primary Care Behavioral Health Fellow  ^^^^^^^^^^^^^^^^^^^^^^^^^^^  Your safety as well as the safety of those around you is important to us. Should you or someone else be in need of them, here are some additional resources:    Feeling overwhelmed and just need a supportive person to talk through a struggling time? (hours 12 PM - 10 PM CST)  Toll Free [phone] or text  Support  to 46613  The [place] Warmline provides a bylm-ww-vrtz approach to mental health recovery, support and wellness. Calls are answered by professionally trained Certified Peer Specialists, who have first hand experience living with a mental health condition. (hours 12 PM - 10 PM CST)    Do you feel like you might be in crisis and potentially need professional services?   [facility] Adult Mental Health Crisis:  [phone]  [facility] Childrens Mental Health Crisis: [phone]    [facility] Adult Mental Health Crisis: [phone]  [facility] Childrens Mental Health Crisis: [phone]    [facility] Multilingual Crisis Line:  [phone]     Crisis Text Line: People who text [place] to 039049 will be connected with a counselor.     [place] Domestic Violence Crisis Line (24-hour [place] crisis line) 1-865.937.6387    Find a local Alcohol or Narcotics Anonymous  meeting:  [email or web address].org/  https://www.[place].org/    If you feel at risk of immediate harm, call 9-1-1 or go directly to the Emergency Department.  ^^^^^^^^^^^^^^^^^^^^^^^^^^^^^^

## 2021-09-14 ENCOUNTER — OFFICE VISIT (OUTPATIENT)
Dept: PSYCHOLOGY | Facility: CLINIC | Age: 26
End: 2021-09-14
Payer: COMMERCIAL

## 2021-09-14 DIAGNOSIS — F41.1 GENERALIZED ANXIETY DISORDER: ICD-10-CM

## 2021-09-14 DIAGNOSIS — F34.1 PERSISTENT DEPRESSIVE DISORDER: Primary | ICD-10-CM

## 2021-09-14 PROCEDURE — 90834 PSYTX W PT 45 MINUTES: CPT | Mod: U7 | Performed by: STUDENT IN AN ORGANIZED HEALTH CARE EDUCATION/TRAINING PROGRAM

## 2021-09-14 NOTE — PROGRESS NOTES
"Behavioral Health Progress Note    Client's Legal Name: Ness Alamo    Client's Preferred Name: Gabby  YOB: 1995  Type of Service: in-person and counseling  Length of Service:   Start time: 11:00 AM    End time: 11:50 AM   Duration: 50 minutes  Attendees: patient    Identifying Information and Presenting Problem:  Gabby is a 25 year old second-generation Lithuanian lesbian female who is being seen for symptoms of chronic depressed mood, hypersomnia, generalized malaise and generalized depression.     Treatment Objective(s) Addressed in This Session:  Goal 1: Gabby will experience fewer depressive symptoms \"I want to not feel like everything is too much work\"  Goal 2: Gabby wants to improve her self confidence and be more engaged in life.    Progress on / Status of Treatment Objective(s) / Homework:  Stable     Patient Health Questionnaire - 9:  PHQ 7/12/2021 8/11/2021 8/25/2021   PHQ-9 Total Score 13 18 16   Q9: Thoughts of better off dead/self-harm past 2 weeks Not at all Not at all Not at all      Generalized Anxiety Disorder - 7:  RICHELLE-7 SCORE 7/12/2021 8/11/2021 8/25/2021   Total Score 6 16 10     Topics Discussed/Interventions Provided:  Provided Acceptance and Commitment based therapy focusing on acceptance and doing what matters. Gabby noted some increased in depressive symptoms over the past week and increased awareness of the emotions shame and anger. Last week we had discussed creating art as something that is meaningful and self nurturing. Over the past week, Gabby was tasked to examine \"lean in and lean out\" behavior as it related to creating art. Patient and provider spent much of session discussing the anatomy of shame as a \"lean out\" emotion that hides behind anger sometimes and how to increase engagement/lean in behavior related to shame. We also dicussed and provider gave some education on healthy anger.    Assessment:   The patient appeared to be active and engaged in today's session and " "was receptive to feedback. No safety concerns noted    Mental Status:   During interaction with the examiner today, Xiem was cooperative, open, engaged and pleasant. Patient was generally alert and oriented to person, place, time, and situation. They were casually dressed and appropriately groomed. Patient's attire was appropriate for the weather and occasion. Eye contact was good; psychomotor functioning  normal or unremarkable. Speech was normal limits tone, rate, volume; largely coherent and relevant to topic. Mood was \"fine\" and neutral; affect was blunted. Thought processes were unremarkable. Thought content was not remarkable with no evidence of psychotic features and no evidence of suicide, homicide, or nonsuicidal self injury related thoughts, intent, urges, planning, behavior/recent attempts. Memory appeared grossly intact without being formally evaluated. Insight appeared good and judgment good.  Patient exhibited good impulse control during the appointment.      Does the patient appear to be at imminent risk of harm to self/others at this time? No     The session was necessary to address symptoms of depression, anxiety, and poor body image that have been interfering with patient's ability to function in areas related to work family relationships social relationships intimate partner relationships day to day self care or health behaviors meaningful activities. Ongoing psychotherapy is necessary to provide counseling and improve functioning with daily activities.     DSM-5 Diagnosis:  Persistent Depressive Disorder, with intermittent major depressive episodes, with current mild episode  Generalized Anxiety Disorder    Plan:  1. Follow up with this provider 10/8/2021 @ 8:30 AM in clinic  2. Work on goals as noted in patient instructions.  3. Utilize crisis resources as needed.  4. After Visit Summary will be reviewed in Luis Cline Psy.D.  they/them/theirs  Primary Care Behavioral Health " Fellow     NOTE: Treatment plan update due 11/2/2021.  Diagnostic assessment update due 4/12/2022.

## 2021-09-14 NOTE — PATIENT INSTRUCTIONS
It was a pleasure to work with you today Xiem!    The homework and goals we discussed today:  1) [email or web address]    Our next scheduled appointment(s): 10/8/2021 @ 8:30 AM in clinic    If you need to change a future appointment for any reason, the most efficient way to do so is to call the  at [phone].    Denia Cline Psy.D.  they/them/theirs  Primary Care Behavioral Health Fellow  ^^^^^^^^^^^^^^^^^^^^^^^^^^^  Your safety as well as the safety of those around you is important to us. Should you or someone else be in need of them, here are some additional resources:    Feeling overwhelmed and just need a supportive person to talk through a struggling time? (hours 12 PM - 10 PM CST)  Toll Free [phone] or text  Support  to [address]308  The [place] Warmline provides a puea-jj-ixxv approach to mental health recovery, support and wellness. Calls are answered by professionally trained Certified Peer Specialists, who have first hand experience living with a mental health condition. (hours 12 PM - 10 PM CST)    Do you feel like you might be in crisis and potentially need professional services?   [facility] Adult Mental Health Crisis:  [phone]  [facility] Children's Mental Health Crisis: [phone]    [facility] Adult Mental Health Crisis: [phone]  [facility] Children's Mental Health Crisis: [phone]    [facility] Multilingual Crisis Line:  [phone]     Crisis Text Line: People who text [place] to 731698 will be connected with a counselor.     [place] Domestic Violence Crisis Line (24-hour [place] crisis line) 1-669.679.7390    Find a local Alcohol or Narcotics Anonymous meeting:  Https://aam[place].org/  https://www.[place].org/    If you feel at risk of immediate harm, call 9-1-1 or go directly to the Emergency Department.  ^^^^^^^^^^^^^^^^^^^^^^^^^^^^^^

## 2021-09-20 ENCOUNTER — OFFICE VISIT (OUTPATIENT)
Dept: FAMILY MEDICINE | Facility: CLINIC | Age: 26
End: 2021-09-20
Payer: COMMERCIAL

## 2021-09-20 VITALS
SYSTOLIC BLOOD PRESSURE: 111 MMHG | OXYGEN SATURATION: 93 % | BODY MASS INDEX: 29.72 KG/M2 | RESPIRATION RATE: 16 BRPM | HEART RATE: 76 BPM | HEIGHT: 65 IN | TEMPERATURE: 98.3 F | WEIGHT: 178.4 LBS | DIASTOLIC BLOOD PRESSURE: 73 MMHG

## 2021-09-20 DIAGNOSIS — M53.3 PAIN IN THE COCCYX: ICD-10-CM

## 2021-09-20 DIAGNOSIS — F41.1 GENERALIZED ANXIETY DISORDER: ICD-10-CM

## 2021-09-20 DIAGNOSIS — F32.1 CURRENT MODERATE EPISODE OF MAJOR DEPRESSIVE DISORDER, UNSPECIFIED WHETHER RECURRENT (H): Primary | ICD-10-CM

## 2021-09-20 DIAGNOSIS — Z23 NEED FOR VACCINATION: ICD-10-CM

## 2021-09-20 DIAGNOSIS — Z23 NEED FOR PROPHYLACTIC VACCINATION AND INOCULATION AGAINST INFLUENZA: ICD-10-CM

## 2021-09-20 DIAGNOSIS — R73.03 PREDIABETES: ICD-10-CM

## 2021-09-20 LAB
CHOLEST SERPL-MCNC: 168 MG/DL
FASTING STATUS PATIENT QL REPORTED: ABNORMAL
HBA1C MFR BLD: 5.6 % (ref 0–5.6)
HDLC SERPL-MCNC: 35 MG/DL
LDLC SERPL CALC-MCNC: 89 MG/DL
TRIGL SERPL-MCNC: 219 MG/DL

## 2021-09-20 PROCEDURE — 80061 LIPID PANEL: CPT

## 2021-09-20 PROCEDURE — 99214 OFFICE O/P EST MOD 30 MIN: CPT | Mod: 25

## 2021-09-20 PROCEDURE — 36415 COLL VENOUS BLD VENIPUNCTURE: CPT

## 2021-09-20 PROCEDURE — 83036 HEMOGLOBIN GLYCOSYLATED A1C: CPT

## 2021-09-20 PROCEDURE — 90472 IMMUNIZATION ADMIN EACH ADD: CPT

## 2021-09-20 PROCEDURE — 90686 IIV4 VACC NO PRSV 0.5 ML IM: CPT

## 2021-09-20 PROCEDURE — 90651 9VHPV VACCINE 2/3 DOSE IM: CPT

## 2021-09-20 PROCEDURE — 90471 IMMUNIZATION ADMIN: CPT

## 2021-09-20 RX ORDER — DULOXETIN HYDROCHLORIDE 30 MG/1
CAPSULE, DELAYED RELEASE ORAL
Qty: 30 CAPSULE | Refills: 4 | Status: SHIPPED | OUTPATIENT
Start: 2021-09-20 | End: 2021-10-18

## 2021-09-20 ASSESSMENT — MIFFLIN-ST. JEOR: SCORE: 1555.03

## 2021-09-20 NOTE — PROGRESS NOTES
Preceptor Attestation:  I discussed the patient with the resident and evaluated the patient in person. I have verified the content of the note, which accurately reflects my assessment of the patient and the plan of care.  Supervising Physician:  Lakia Blake MD.

## 2021-09-20 NOTE — PROGRESS NOTES
Assessment and Plan        Current moderate episode of major depressive disorder, unspecified whether recurrent (H)  Patient states her moods have improved significantly. She understands that there is room to increase her cymbalta but would like to continue at 30mg. Will continue therapy.  -     DULoxetine (CYMBALTA) 30 MG capsule; TAKE 1 CAPSULE(30 MG) BY MOUTH DAILY    Pain in the coccyx  Patient offered OMT and PT referral. Patient unsure which route of therapy she would like and will alert clinic when she has decided.  Will consider MRI and/or orthopedics if necessary.     Prediabetes  Patient given information and counseled on healthy eating and exercise.   -     Hemoglobin A1c; Future  -     Lipid panel reflex to direct LDL Fasting; Future    Need for prophylactic vaccination and inoculation against influenza  -     INFLUENZA VACCINE IM > 6 MONTHS VALENT IIV4 (AFLURIA/FLUZONE)    Need for vaccination  -     HPV9 (Gardasil 9 )      Options for treatment and follow-up care were reviewed with the patient. Patient engaged in the decision making process and verbalized understanding of the options discussed and agreed with the final plan.    Patient was staffed with attending physician Dr. Hayden Galvez, DO PGY1           HPI       Ness Alamo is a 25 year old year old female w/ PMH of   Patient Active Problem List   Diagnosis     Exotropia     CARDIOVASCULAR SCREENING; LDL GOAL LESS THAN 160     Generalized anxiety disorder     Persistent depressive disorder with intermittent major depressive episodes with current moderate episode     Menorrhagia with regular cycle     Nexplanon in place    who presents for Prediabetes, pain in coccyx, and check on her depression.    Prediabetes: Prior A1c was 5.8. Patient is working on healthy lifestyle, including eating salads often. Will begin an regimen daily.     Depression: Recently started on cymbalta 30 mg. She states that she still feels depressed but she is  "better equiped to deal with her feelings. She states that her therapy and medication as a combination is working well. She feels like she has more energy and is able to negotiate better with herself in her head.     Coccyx pain: Patient fell in May of 2020, with subsequent X-ray, which was negative. She states she is having midline, localized pain without radiation. She reports it is dull, achy, at rest, but very sharp when she goes from seated to standing. No numbness or tingling. She uses a donut pillow for her desk which helps the pain, she takes ibuprofen when the pain is \"very bad\" which is every day as of late.     Preventative: Patient due for flu shot and her third HPV vaccine today.     Problem, Medication and Allergy Lists were reviewed and updated if needed.    Patient is an established patient of this clinic.         Review of Systems:   12 point ROS negative other than as specified above.         Physical Exam:   /73 (BP Location: Left arm, Patient Position: Sitting, Cuff Size: Adult Large)   Pulse 76   Temp 98.3  F (36.8  C) (Oral)   Resp 16   Ht 1.659 m (5' 5.31\")   Wt 80.9 kg (178 lb 6.4 oz)   LMP 09/18/2021 (Exact Date)   SpO2 93%   BMI 29.41 kg/m      Vitals were reviewed and were normal  Blood pressure 111/73, pulse 76, temperature 98.3  F (36.8  C), temperature source Oral, resp. rate 16, height 1.659 m (5' 5.31\"), weight 80.9 kg (178 lb 6.4 oz), last menstrual period 09/18/2021, SpO2 93 %, not currently breastfeeding.       Exam:  Constitutional: healthy, alert, no distress, and cooperative  Head: Normocephalic. No masses, lesions, tenderness or abnormalities  Neck: Neck supple. No adenopathy.  ENT: throat normal without erythema or exudate  Cardiovascular: RRR w/o audible murmur  Respiratory: bilateral clear lungs w/o wheezing, crackles or rhonchi; breathing comfortably on RA  Gastrointestinal: Abdomen soft, non-tender. BS normal.  Musculoskeletal: Tenderness to pinpoint palpation " localized to coccyx, no tenderness over sacral region. Extremities normal- no gross deformities noted, gait normal, and normal muscle tone  Skin: no suspicious lesions or rashes  Neurologic: grossly normal CN; normal strength, sensation, & tone  Psychiatric: mentation appears normal and affect normal/bright        Results:   Results are ordered and pending

## 2021-09-20 NOTE — PATIENT INSTRUCTIONS
Thank you for discussing your health today!    We discussed the following at this visit:    1) Prediabetes. We discussed what this means and healthy lifestyle choices. I will call you this time after we get results. Great initiative to come in and commit to being healthier!   2) Coccyx pain: We discussed either option of physical therapy or OMT. When we talk on the phone for your results you can tell me which therapy you prefer.   3) Cymbalta Medication: Great work on your moods and being proactive in your care. We will continue this dose of the medication and I have sent refills for you.     Please make an appointment for therapy when you decide.    Please call the clinic with any questions or concerns.    Sixto Galvez, DO

## 2021-09-22 ENCOUNTER — TELEPHONE (OUTPATIENT)
Dept: FAMILY MEDICINE | Facility: CLINIC | Age: 26
End: 2021-09-22

## 2021-09-22 DIAGNOSIS — M53.3 PAIN IN THE COCCYX: Primary | ICD-10-CM

## 2021-09-22 NOTE — TELEPHONE ENCOUNTER
[facility] phone call message- general phone call:    Reason for call: Returning your call    Return call needed: Yes    OK to leave a message on voice mail? Yes    Primary language: English      needed? No    Call taken on September 22, 2021 at 4:13 PM by Valentina Molina

## 2021-10-08 ENCOUNTER — OFFICE VISIT (OUTPATIENT)
Dept: PSYCHOLOGY | Facility: CLINIC | Age: 26
End: 2021-10-08
Payer: COMMERCIAL

## 2021-10-08 DIAGNOSIS — F34.1 PERSISTENT DEPRESSIVE DISORDER: Primary | ICD-10-CM

## 2021-10-08 DIAGNOSIS — F41.1 GENERALIZED ANXIETY DISORDER: ICD-10-CM

## 2021-10-08 PROCEDURE — 90834 PSYTX W PT 45 MINUTES: CPT | Mod: U7 | Performed by: STUDENT IN AN ORGANIZED HEALTH CARE EDUCATION/TRAINING PROGRAM

## 2021-10-08 ASSESSMENT — ANXIETY QUESTIONNAIRES
2. NOT BEING ABLE TO STOP OR CONTROL WORRYING: MORE THAN HALF THE DAYS
6. BECOMING EASILY ANNOYED OR IRRITABLE: MORE THAN HALF THE DAYS
IF YOU CHECKED OFF ANY PROBLEMS ON THIS QUESTIONNAIRE, HOW DIFFICULT HAVE THESE PROBLEMS MADE IT FOR YOU TO DO YOUR WORK, TAKE CARE OF THINGS AT HOME, OR GET ALONG WITH OTHER PEOPLE: SOMEWHAT DIFFICULT
GAD7 TOTAL SCORE: 11
3. WORRYING TOO MUCH ABOUT DIFFERENT THINGS: SEVERAL DAYS
4. TROUBLE RELAXING: NEARLY EVERY DAY
7. FEELING AFRAID AS IF SOMETHING AWFUL MIGHT HAPPEN: NOT AT ALL
1. FEELING NERVOUS, ANXIOUS, OR ON EDGE: MORE THAN HALF THE DAYS
5. BEING SO RESTLESS THAT IT IS HARD TO SIT STILL: SEVERAL DAYS

## 2021-10-08 NOTE — PROGRESS NOTES
"Behavioral Health Progress Note    Client's Legal Name: Ness Alamo    Client's Preferred Name: Gabby  YOB: 1995  Type of Service: in-person, counseling  Length of Service:   Start time: 8:25 AM    End time: 9:15 AM   Duration: 50 minutes  Attendees: patient    Identifying Information and Presenting Problem:  Gabby is a 25 year old second-generation Russian lesbian female who is being seen for symptoms of chronic depressed mood, hypersomnia, generalized malaise and generalized depression.     Treatment Objective(s) Addressed in This Session:  Goal 1: Gabby will experience fewer depressive symptoms \"I want to not feel like everything is too much work\"  Goal 2: Gabby wants to improve her self confidence and be more engaged in life.    Progress on / Status of Treatment Objective(s) / Homework:  Satisfactory progress     Patient Health Questionnaire - 9:  PHQ 8/11/2021 8/25/2021 10/8/2021   PHQ-9 Total Score 18 16 11   Q9: Thoughts of better off dead/self-harm past 2 weeks Not at all Not at all Not at all      Generalized Anxiety Disorder - 7:  RICHELLE-7 SCORE 8/11/2021 8/25/2021 10/8/2021   Total Score 16 10 11     Topics Discussed/Interventions Provided:  Provided Acceptance and Commitment based therapy focusing on acceptance and doing what matters. Our session today primarily related to Gabby's experience in her intimate relationship and generally as she strives to be more assertive and transparent with her feelings. She identified during our discussion last week that she does value honesty and authenticity which is something that she has not been practicing regularly due to the strong emotions of anger, shame, and other \"lean out\" emotions. We explored different ways to recognize and diffuse from these strong emotions with curiosity, taking a moment to step back, or talking to herself in order to process/realize what she is feeling. Provided empathy as Gabby shared some feelings of sadness, disillusionment, " "and hurt reflecting upon how \"I haven't really been happy in my relationship for a long time but I didn't want to acknowledge it.\" The last part of session was exploring how Gabby's experience in therapy has supported her experience that some vulnerability and truthful sharing of emotions is important to being understood and strengthening a relationship. We explored ways that she might communicate what she is feeling during the couple's relationship therapy later today.     Assessment:   The patient appeared to be active and engaged in today's session and was receptive to feedback. Gabby appeared to be connecting well with our discussions today relating to emotional honesty and authenticity. Her measurement based care item scores reflected a decrease in depressive symptoms and stable anxiety symptoms which also matches Gabby's subjective evaluation of her experience lately. No safety concerns noted.     Mental Status:   During interaction with the examiner today, Gabby was cooperative, open, engaged and crying at time but able to be consoled. Patient was generally alert and oriented to person, place, time, and situation. They were casually dressed and appropriately groomed. Patient's attire was appropriate for the weather and occasion. Eye contact was adequate; psychomotor functioning  normal or unremarkable. Speech was normal limits tone, rate, volume; largely coherent and relevant to topic. Mood was \"okay\", sad and overwhelmed; affect was normal. Thought processes were unremarkable. Thought content was not remarkable with no evidence of psychotic features and no evidence of suicide, homicide, or nonsuicidal self injury related thoughts, intent, urges, planning, behavior/recent attempts. Memory appeared grossly intact without being formally evaluated. Insight appeared good and judgment good. Patient exhibited good impulse control during the appointment.     Does the patient appear to be at imminent risk of harm to " self/others at this time? No    The session was necessary to address symptoms of depression, anxiety, and poor body image that have been interfering with patient's ability to function in areas related to work family relationships social relationships intimate partner relationships day to day self care or health behaviors meaningful activities. Ongoing psychotherapy is necessary to provide counseling and improve functioning with daily activities.    DSM-5 Diagnosis:  300.4 (F34.1) Persistent Depressive Disorder, with intermittent major depressive episodes, with current mild episode  300.02 (F41.1) Generalized Anxiety Disorder    Plan:  1. Follow up with this provider 10/22/2021 @ 8:30 AM in clinic  2. Work on goals as noted in patient instructions.  3. Utilize crisis resources as needed.  4. After Visit Summary will be reviewed in Luis Cline Psy.D.  they/them/theirs  Primary Care Behavioral Health Fellow    NOTE: Treatment plan update due 11/2/2021.  Diagnostic assessment update due 4/12/2022.

## 2021-10-09 ENCOUNTER — HOSPITAL ENCOUNTER (OUTPATIENT)
Dept: MRI IMAGING | Facility: CLINIC | Age: 26
Discharge: HOME | End: 2021-10-09
Payer: COMMERCIAL

## 2021-10-09 DIAGNOSIS — M53.3 PAIN IN THE COCCYX: ICD-10-CM

## 2021-10-09 PROCEDURE — 72195 MRI PELVIS W/O DYE: CPT

## 2021-10-13 ASSESSMENT — PATIENT HEALTH QUESTIONNAIRE - PHQ9: SUM OF ALL RESPONSES TO PHQ QUESTIONS 1-9: 11

## 2021-10-13 NOTE — PATIENT INSTRUCTIONS
It was a pleasure to work with you today Xiem!    The homework and goals we discussed today:  1) Continue to practice with the feeling's wheel, what emotions might be coming up that are at a lower intensity or clouded by anger or shame?    Our next scheduled appointment(s): 10/22/2021 @ 8:30 AM in clinic    If you need to change a future appointment for any reason, the most efficient way to do so is to call the  at [phone].    Denia Cline Psy.D.  they/them/theirs  Primary Care Behavioral Health Fellow  ^^^^^^^^^^^^^^^^^^^^^^^^^^^  Your safety as well as the safety of those around you is important to us. Should you or someone else be in need of them, here are some additional resources:    Feeling overwhelmed and just need a supportive person to talk through a struggling time? (hours 12 PM - 10 PM CST)  Toll Free [phone] or text  Support  to 94095  The [place] Warmline provides a nmbv-eu-ixnl approach to mental health recovery, support and wellness. Calls are answered by professionally trained Certified Peer Specialists, who have first hand experience living with a mental health condition. (hours 12 PM - 10 PM CST)    Do you feel like you might be in crisis and potentially need professional services?   [facility] Adult Mental Health Crisis:  [phone]  [facility] Children's Mental Health Crisis: [phone]    [facility] Adult Mental Health Crisis: [phone]  [facility] Children's Mental Health Crisis: [phone]    [facility] Multilingual Crisis Line:  [phone]     Crisis Text Line: People who text [place] to 263818 will be connected with a counselor.     [place] Domestic Violence Crisis Line (24-hour [place] crisis line) 1-988.615.9969    Find a local Alcohol or Narcotics Anonymous meeting:  [email or web address].[email or web address]  https://www.nam[place].org/    If you feel at risk of immediate harm, call 9-1-1 or go directly to the Emergency  Department.  ^^^^^^^^^^^^^^^^^^^^^^^^^^^^^^

## 2021-10-14 ASSESSMENT — ANXIETY QUESTIONNAIRES: GAD7 TOTAL SCORE: 11

## 2021-10-15 DIAGNOSIS — F41.1 GENERALIZED ANXIETY DISORDER: ICD-10-CM

## 2021-10-15 DIAGNOSIS — F32.1 CURRENT MODERATE EPISODE OF MAJOR DEPRESSIVE DISORDER, UNSPECIFIED WHETHER RECURRENT (H): ICD-10-CM

## 2021-10-18 RX ORDER — DULOXETIN HYDROCHLORIDE 30 MG/1
CAPSULE, DELAYED RELEASE ORAL
Qty: 30 CAPSULE | Refills: 4 | Status: SHIPPED | OUTPATIENT
Start: 2021-10-18 | End: 2022-05-27

## 2021-11-10 ENCOUNTER — TELEPHONE (OUTPATIENT)
Dept: PSYCHOLOGY | Facility: CLINIC | Age: 26
End: 2021-11-10
Payer: COMMERCIAL

## 2021-11-10 PROCEDURE — 99207 PR NO CHARGE LOS: CPT | Performed by: STUDENT IN AN ORGANIZED HEALTH CARE EDUCATION/TRAINING PROGRAM

## 2021-11-10 NOTE — TELEPHONE ENCOUNTER
Contacted Gabby by phone number recorded in medical record ending [id number] at 8:30 AM. For check-in and to offer rescheduling because I saw that there was not an appointment in the system.    Spoke to patient for a few minutes, she shared that things have been going really well and that she is enjoying her birthday week. She would like to schedule an appointment with me to continue therapy and we were able to pick a time for next week.    Gabby denied any acute mental health related concerns or questions and thanked this provider for the call.    Duration of contact ~ 3 minutes    Plan:   Patient has a follow up therapy appointment scheduled 11/19/2021 @ 9:30 AM in clinic with Dr. Cline.    Denia Cline, Psy.D.  they/them/theirs  Primary Care Behavioral Health Fellow

## 2021-11-19 ENCOUNTER — OFFICE VISIT (OUTPATIENT)
Dept: PSYCHOLOGY | Facility: CLINIC | Age: 26
End: 2021-11-19
Payer: COMMERCIAL

## 2021-11-19 DIAGNOSIS — F41.1 GENERALIZED ANXIETY DISORDER: ICD-10-CM

## 2021-11-19 DIAGNOSIS — F34.1 PERSISTENT DEPRESSIVE DISORDER: Primary | ICD-10-CM

## 2021-11-19 PROCEDURE — 90834 PSYTX W PT 45 MINUTES: CPT | Mod: U7 | Performed by: STUDENT IN AN ORGANIZED HEALTH CARE EDUCATION/TRAINING PROGRAM

## 2021-11-19 NOTE — PATIENT INSTRUCTIONS
It was a pleasure to work with you today Xiem!    The homework and goals we discussed today:  1) Sy Tavares. 147    Our next scheduled appointment(s): 12/3/2021 @ 9:30 AM in clinic  If you need to change a future appointment for any reason, the most efficient way to do so is to call the  at [phone].    Denia Cline Psy.D.  they/them/theirs  Primary Care Behavioral Health Fellow  ^^^^^^^^^^^^^^^^^^^^^^^^^^^  Your safety as well as the safety of those around you is important to us. Should you or someone else be in need of them, here are some additional resources:    Feeling overwhelmed and just need a supportive person to talk through a struggling time? (hours 12 PM - 10 PM CST)  Toll Free [phone] or text  Support  to 50827  The [place] Warmline provides a dmof-sl-ajpi approach to mental health recovery, support and wellness. Calls are answered by professionally trained Certified Peer Specialists, who have first hand experience living with a mental health condition. (hours 12 PM - 10 PM CST)    Do you feel like you might be in crisis and potentially need professional services?   [facility] Adult Mental Health Crisis:  [phone]  [facility] Childrens Mental Health Crisis: [phone]    [facility] Adult Mental Health Crisis: [phone]  [facility] Children's Mental Health Crisis: [phone]    [facility] Multilingual Crisis Line:  [phone]     Crisis Text Line: People who text [place] to 976783 will be connected with a counselor.     [place] Domestic Violence Crisis Line (24-hour [place] crisis line) [phone]    Find a local Alcohol or Narcotics Anonymous meeting:  [email or web address].[email or web address]  https://www.nam[place].org/    If you feel at risk of immediate harm, call 9-1-1 or go directly to the Emergency Department.  ^^^^^^^^^^^^^^^^^^^^^^^^^^^^^^

## 2021-11-19 NOTE — PROGRESS NOTES
"Behavioral Health Progress Note    Client's Legal Name: Ness Alamo    Client's Preferred Name: Gabby  YOB: 1995  Type of Service: in-person, counseling  Length of Service:   Start time: 9:35 AM    End time: 10:25 AM  Duration: 50 minutes  Attendees: patient    Identifying Information and Presenting Problem:  Gabby is a 26 year old second-generation Fijian lesbian female who is being seen for symptoms of chronic depressed mood, hypersomnia, generalized malaise and generalized depression.     Treatment Objective(s) Addressed in This Session:  Goal 1: Gabby will experience fewer depressive symptoms \"I want to not feel like everything is too much work\"  Goal 2: Gabby wants to improve her self confidence and be more engaged in life.    Progress on / Status of Treatment Objective(s) / Homework:  Stable     Mental Health Screening Questionnaires:  PHQ-9:   PHQ 8/11/2021 8/25/2021 10/8/2021   PHQ-9 Total Score 18 16 11   Q9: Thoughts of better off dead/self-harm past 2 weeks Not at all Not at all Not at all      RICHELLE-7:   RICHELLE-7 SCORE 8/11/2021 8/25/2021 10/8/2021   Total Score 16 10 11     Topics Discussed/Interventions Provided:  Provided Acceptance and Commitment based therapy focusing on building a life worth living. Gabby described that the last month has been pretty good regarding her mental health. She has been working hard to be more transparent with her feelings and communicating this to her partner. Accepting and communicating our feelings honestly and authentically were themes of our last session which we extended today in our discussion of building a life worth living. We explored ways that Gabby can use her values to guide behavior in other areas of life than her romantic relationships. Ex: creativity and global citizenship can be used to create activist art about climate change. We also explored how authenticity and honesty can be applied to other relationships in Gabby's life to improve then - such " "as with her roommate.     Assessment:   The patient appeared to be active and engaged in today's session and was receptive to feedback.     Mental Status:   During interaction with the examiner today, Gabby was cooperative, open, engaged and pleasant. Patient was generally alert and oriented to person, place, time, and situation. They were casually dressed, appropriately groomed and appeared stated age. Patient's attire was appropriate for the weather and occasion. Eye contact was adequate. Psychomotor functioning: normal or unremarkable. Speech was normal limits tone, rate, volume; largely coherent and relevant to topic. Mood was \"okay\" and neutral; affect was blunted. Thought processes were unremarkable. Thought content was not remarkable with no evidence of psychotic features and no evidence of suicide, homicide, or nonsuicidal self injury related thoughts, intent, urges, planning, behavior/recent attempts. Memory appeared grossly intact without being formally evaluated. Insight: good. Judgment was good. Patient exhibited good impulse control during the appointment.     Does the patient appear to be at imminent risk of harm to self/others at this time? No    The session was necessary to address symptoms of depression, anxiety, and poor body image that have been interfering with patient's ability to function in areas related to work family relationships social relationships intimate partner relationships day to day self care or health behaviors meaningful activities. Ongoing psychotherapy is necessary to provide counseling and improve functioning with daily activities.     DSM-5 Diagnosis:  300.4 (F34.1) Persistent Depressive Disorder, with intermittent major depressive episodes, with current mild episode  300.02 (F41.1) Generalized Anxiety Disorder    Plan:  1. Follow up with this provider 12/3/2021 @ 9:30 AM in clinic  2. Work on goals as noted in patient instructions.  3. Utilize crisis resources as needed.  4. " After Visit Summary will be reviewed in Luis Cline Psy.D.  they/them/theirs  Primary Care Behavioral Health Fellow    NOTE: Treatment plan update due 11/2/2021.  Diagnostic assessment update due 4/12/2022.

## 2021-11-23 ENCOUNTER — OFFICE VISIT (OUTPATIENT)
Dept: FAMILY MEDICINE | Facility: CLINIC | Age: 26
End: 2021-11-23
Payer: COMMERCIAL

## 2021-11-23 VITALS
RESPIRATION RATE: 18 BRPM | DIASTOLIC BLOOD PRESSURE: 76 MMHG | WEIGHT: 178 LBS | SYSTOLIC BLOOD PRESSURE: 119 MMHG | OXYGEN SATURATION: 90 % | HEART RATE: 84 BPM | TEMPERATURE: 98.1 F | BODY MASS INDEX: 29.34 KG/M2

## 2021-11-23 DIAGNOSIS — F32.1 CURRENT MODERATE EPISODE OF MAJOR DEPRESSIVE DISORDER, UNSPECIFIED WHETHER RECURRENT (H): ICD-10-CM

## 2021-11-23 DIAGNOSIS — F41.1 GENERALIZED ANXIETY DISORDER: ICD-10-CM

## 2021-11-23 DIAGNOSIS — B35.4 TINEA CORPORIS: ICD-10-CM

## 2021-11-23 DIAGNOSIS — M53.3 PAIN IN THE COCCYX: ICD-10-CM

## 2021-11-23 DIAGNOSIS — Z23 HIGH PRIORITY FOR 2019-NCOV VACCINE: Primary | ICD-10-CM

## 2021-11-23 PROCEDURE — 0004A COVID-19,PF,PFIZER (12+ YRS): CPT

## 2021-11-23 PROCEDURE — 91300 COVID-19,PF,PFIZER (12+ YRS): CPT

## 2021-11-23 PROCEDURE — 99213 OFFICE O/P EST LOW 20 MIN: CPT | Mod: 25

## 2021-11-23 RX ORDER — CLOTRIMAZOLE 1 %
CREAM (GRAM) TOPICAL 2 TIMES DAILY
Qty: 45 G | Refills: 0 | Status: SHIPPED | OUTPATIENT
Start: 2021-11-23 | End: 2022-05-27

## 2021-11-23 NOTE — PROGRESS NOTES
"  Assessment & Plan     High priority for 2019-nCoV vaccine  - COVID-19,PF,PFIZER (12+ Yrs PURPLE LABEL)    Pain in the coccyx  Previous xray and MRI normal. Continues to have pain with sitting and standing up. Donut and ibuprofen help but interested in PT or OMT   - Physical Therapy Referral    Generalized anxiety disorder  Current moderate episode of major depressive disorder, unspecified whether recurrent (H)  On Cymbalta 30, which is working well for her. PHQ9 score 10, GAD7 score 15. Continues to use therapy provided through [facility].    Tinea corporis  Hyperpigmented pruritic areas under both axillas x3 days. Also with excoriation of upper intergluteal cleft. Tried hydrocortisone cream without improvement.  - clotrimazole (LOTRIMIN) 1 % external cream  Dispense: 45 g; Refill: 0       BMI:   Estimated body mass index is 29.34 kg/m  as calculated from the following:    Height as of 9/20/21: 1.659 m (5' 5.31\").    Weight as of this encounter: 80.7 kg (178 lb).   Weight management plan: Discussed healthy diet and exercise guidelines      Return if symptoms worsen or fail to improve.    Ana Buckley MD PGY1  [facility]  11/24/21    I precepted today with Dr. Donn Cooper    Subjective   Gabby is a 26 year old F with PMH of coccyx pain, depression and anxiety, menorrhagia who presents for the following health issues.    Rash-  Rash under both axilla. Present for 3 days. Worse on the left but now developing on the right. Pruritic. Tried hydrocortisone cream and lotion without relief. Also has painful area of intergluteal cleft. Noticed it a couple days ago. Feels slightly painful because located near tailbone, where she chronically has pain. Has not happened before. No fever/chills.     Coccyx pain - Continues to be present. Worse with sitting for long periods and when standing up. No changes to quality of the pain. Would like to try either PT or OMT, she does not have a preference. " Still using the donut and ibuprofen.     Depression - Has been continuing therapy. The medication Cymbalta 30mg has also been helping. Not interested in increasing the dose at this time. No SI/HI.     Review of Systems   Constitutional, HEENT, cardiovascular, pulmonary, gi and gu systems are negative, except as otherwise noted.      Objective    /76 (BP Location: Left arm, Cuff Size: Adult Small)   Pulse 84   Temp 98.1  F (36.7  C) (Oral)   Resp 18   Wt 80.7 kg (178 lb)   SpO2 90%   BMI 29.34 kg/m    Body mass index is 29.34 kg/m .  Physical Exam   GENERAL: awake, alert and no distress  NECK: no aasymmetry  RESP: lungs clear to auscultation - no rales, rhonchi or wheezes  CV: regular rate  ABDOMEN: soft, nontender  MS: no gross musculoskeletal defects noted, no edema  SKIN: hyperpigmented macule with slightly raised border on bilateral axilla. 3-4cm excoriation of upper intergluteal cleft without bleeding or drainage  NEURO: grossly normal strength and tone, mentation intact and speech normal  PSYCH: mentation appears normal, affect normal/bright    ----- Service Performed and Documented by Resident or Fellow ------

## 2021-11-23 NOTE — PROGRESS NOTES
Preceptor Attestation:    I discussed the patient with the resident and evaluated the patient in person. I have verified the content of the note, which accurately reflects my assessment of the patient and the plan of care.   Supervising Physician:  Donn Cooper MD.

## 2021-11-23 NOTE — PATIENT INSTRUCTIONS
1) Use lotrimin cream twice daily  in armpits and tailbone area. Use until rash resolves. May take 2-3 weeks for resolution. Follow up if symptoms persist or worsen     2) physical therapy referral for tailbone pain. They will call you to schedule. Make sure to also apply those exercises to your daily routine at home for the maximum benefit

## 2021-11-24 ASSESSMENT — PATIENT HEALTH QUESTIONNAIRE - PHQ9: 5. POOR APPETITE OR OVEREATING: MORE THAN HALF THE DAYS

## 2021-11-24 ASSESSMENT — ANXIETY QUESTIONNAIRES
1. FEELING NERVOUS, ANXIOUS, OR ON EDGE: MORE THAN HALF THE DAYS
2. NOT BEING ABLE TO STOP OR CONTROL WORRYING: NEARLY EVERY DAY
6. BECOMING EASILY ANNOYED OR IRRITABLE: NOT AT ALL
7. FEELING AFRAID AS IF SOMETHING AWFUL MIGHT HAPPEN: NEARLY EVERY DAY
IF YOU CHECKED OFF ANY PROBLEMS ON THIS QUESTIONNAIRE, HOW DIFFICULT HAVE THESE PROBLEMS MADE IT FOR YOU TO DO YOUR WORK, TAKE CARE OF THINGS AT HOME, OR GET ALONG WITH OTHER PEOPLE: SOMEWHAT DIFFICULT
5. BEING SO RESTLESS THAT IT IS HARD TO SIT STILL: NEARLY EVERY DAY
GAD7 TOTAL SCORE: 15
3. WORRYING TOO MUCH ABOUT DIFFERENT THINGS: MORE THAN HALF THE DAYS

## 2021-11-25 ASSESSMENT — PATIENT HEALTH QUESTIONNAIRE - PHQ9: SUM OF ALL RESPONSES TO PHQ QUESTIONS 1-9: 10

## 2021-11-25 ASSESSMENT — ANXIETY QUESTIONNAIRES: GAD7 TOTAL SCORE: 15

## 2022-05-27 ENCOUNTER — OFFICE VISIT (OUTPATIENT)
Dept: FAMILY MEDICINE | Facility: CLINIC | Age: 27
End: 2022-05-27
Payer: COMMERCIAL

## 2022-05-27 VITALS
TEMPERATURE: 97.8 F | SYSTOLIC BLOOD PRESSURE: 121 MMHG | OXYGEN SATURATION: 95 % | RESPIRATION RATE: 16 BRPM | WEIGHT: 186.4 LBS | HEART RATE: 78 BPM | DIASTOLIC BLOOD PRESSURE: 85 MMHG | BODY MASS INDEX: 30.72 KG/M2

## 2022-05-27 DIAGNOSIS — F32.1 CURRENT MODERATE EPISODE OF MAJOR DEPRESSIVE DISORDER, UNSPECIFIED WHETHER RECURRENT (H): ICD-10-CM

## 2022-05-27 DIAGNOSIS — R45.851 SUICIDAL THOUGHTS: ICD-10-CM

## 2022-05-27 DIAGNOSIS — F41.1 GENERALIZED ANXIETY DISORDER: ICD-10-CM

## 2022-05-27 DIAGNOSIS — F33.41 RECURRENT MAJOR DEPRESSIVE DISORDER, IN PARTIAL REMISSION (H): Primary | ICD-10-CM

## 2022-05-27 PROCEDURE — 99214 OFFICE O/P EST MOD 30 MIN: CPT | Mod: GC | Performed by: STUDENT IN AN ORGANIZED HEALTH CARE EDUCATION/TRAINING PROGRAM

## 2022-05-27 RX ORDER — DULOXETIN HYDROCHLORIDE 30 MG/1
CAPSULE, DELAYED RELEASE ORAL
Qty: 90 CAPSULE | Refills: 3 | Status: SHIPPED | OUTPATIENT
Start: 2022-05-27 | End: 2022-10-06

## 2022-05-27 ASSESSMENT — PATIENT HEALTH QUESTIONNAIRE - PHQ9
5. POOR APPETITE OR OVEREATING: NOT AT ALL
SUM OF ALL RESPONSES TO PHQ QUESTIONS 1-9: 8

## 2022-05-27 ASSESSMENT — ANXIETY QUESTIONNAIRES
7. FEELING AFRAID AS IF SOMETHING AWFUL MIGHT HAPPEN: SEVERAL DAYS
6. BECOMING EASILY ANNOYED OR IRRITABLE: NOT AT ALL
3. WORRYING TOO MUCH ABOUT DIFFERENT THINGS: NOT AT ALL
GAD7 TOTAL SCORE: 2
5. BEING SO RESTLESS THAT IT IS HARD TO SIT STILL: NOT AT ALL
GAD7 TOTAL SCORE: 2
2. NOT BEING ABLE TO STOP OR CONTROL WORRYING: SEVERAL DAYS
1. FEELING NERVOUS, ANXIOUS, OR ON EDGE: NOT AT ALL

## 2022-05-27 NOTE — PROGRESS NOTES
Assessment & Plan     1. Recurrent major depressive disorder, in partial remission (H)  2. Generalized anxiety disorder  3. Suicidal thoughts  Presents for follow-up of depression/anxiety. Overall mood is better on Cymbalta than it has in the past. She does report fleeting occasional suicidal thoughts but not congruent with mood. Never acted on these and no history of suicide attempts in the past. She has several protective factors- close family/friend support, no history of suicide attempts, no access to firearms, no excessive alcohol or recreational drug use. Discussed options of switching medications, referral to psychiatry, or continuing to monitor and she would prefer to continue with Cymbalta. Provided crisis resources and encouraged her to connect with a therapist. Follow up in 1-2 months or sooner if worsening symptoms.   - DULoxetine (CYMBALTA) 30 MG capsule; Take a capsule  Dispense: 90 capsule; Refill: 3    Prescription drug management.     Nell Mckay MD PGY3  [facility] PADMAJA Peirre is a 26 year old who presents for the following health issues:    HPI     She reports overall that she is feeling well. She thinks that Cymbalta is doing a good job to moderate depression and anxiety. However, she sometimes notes feeling thoughts of wanting to die. When she has these thoughts she is not necessarily feeling anxious, down, or depressed. They come and go. She has never made any plans to act on them. No history of suicide attempt. She does have people that she can talk with and share these feelings with. Previously had a therapist but not currently- she wants to find a new therapist. She is doing couple counseling.     No access to firearms. Drinks about twice weekly- usually just 1 drink. No recreational drug use.     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     Review of Systems   Constitutional, HEENT, cardiovascular, pulmonary, gi and gu systems  are negative, except as otherwise noted.      Objective    /85 (BP Location: Left arm, Patient Position: Sitting, Cuff Size: Adult Regular)   Pulse 78   Temp 97.8  F (36.6  C) (Oral)   Resp 16   Wt 84.6 kg (186 lb 6.4 oz)   SpO2 95%   BMI 30.72 kg/m    Body mass index is 30.72 kg/m .  Physical Exam   GENERAL: healthy, alert and no distress  NECK: no adenopathy, no asymmetry, masses, or scars   RESP: breathing comfortably on room air  CV: appears well-perfused  NEURO: mentation intact and speech normal  PSYCH: mentation appears normal, affect normal/bright    PHQ 8/25/2021 10/8/2021 11/24/2021   PHQ-9 Total Score 16 11 10   Q9: Thoughts of better off dead/self-harm past 2 weeks Not at all Not at all Not at all     PHQ9 today: 8    RICHELLE-7 SCORE 8/25/2021 10/8/2021 11/24/2021   Total Score 10 11 15     GAD7 today: 3

## 2022-05-27 NOTE — PROGRESS NOTES
Preceptor Attestation:    I discussed the patient with the resident and evaluated the patient in person. I have verified the content of the note, which accurately reflects my assessment of the patient and the plan of care.   Supervising Physician:  Luis Awad MD.

## 2022-05-27 NOTE — PATIENT INSTRUCTIONS
CRISIS LINES/SERVICES  If in Immediate danger please go to the ER and/or call 9-1-1  Feeling overwhelmed and just need a supportive person to talk through a struggling time?   Toll Free [phone] or text  Support  to 33622 (hours 12 PM - 10 PM CST)  The [place] Warmline provides a tdnn-qf-yxqy approach to mental health recovery, support and wellness. Calls are answered by professionally trained Certified Peer Specialists, who have first hand experience living with a mental health condition. (hours 12 PM - 10 PM CST)    Do you feel like you might be in crisis and potentially need professional services?   National Crisis Lines:  7-270-ZKHMIMW (1-238.683.7567) - www.Optinuity  1-690-501-TALK (1313) - www.suicidepreventionlifeline.org  [place]:  Crisis Text Line: Text [place] to 575267. Free support at your fingertips 24/7  People who text [place] to 607965 will be connected with a counselor. Crisis Text Line is available 24 hours a day, seven days a week.  [facility] Multilingual Crisis Line:  [phone]  [facility]:  COPE - Adult (psychiatric crisis, but cannot transport self): [phone]   COPE - Child: [phone]  [place] Psychiatric Services - [facility] [place]24 hour crisis walk-in and crisis line): [phone]  [address], [place]  [facility] (Emergency Psychiatric Evaluation): [phone]  [facility]:  [facility] Adult Crisis Line (crisis counseling and walk-in urgent care mental health): [phone]   Adult Residential Crisis Centers:   [employer] operates two Adult Residential Crisis Centers in the area: [facility] and [facility] ([place])  These facilities are a step below in-patient hospital care, providing a three to ten-day stay for individuals who are at a moderate but not a high risk for self-harm. The crisis centers and other [employer] programs can be reached through their central screening at [phone].  Domestic  Violence:  [place] Domestic Violence Crisis Line (24-hour [place] crisis line) 3-179-234-3014    If in Immediate danger please go to the ER and/or call 9-1-1

## 2022-05-31 RX ORDER — DULOXETIN HYDROCHLORIDE 30 MG/1
CAPSULE, DELAYED RELEASE ORAL
Qty: 30 CAPSULE | Refills: 4 | OUTPATIENT
Start: 2022-05-31

## 2022-06-12 ENCOUNTER — HEALTH MAINTENANCE LETTER (OUTPATIENT)
Age: 27
End: 2022-06-12

## 2022-09-22 ENCOUNTER — OFFICE VISIT (OUTPATIENT)
Dept: URGENT CARE | Facility: URGENT CARE | Age: 27
End: 2022-09-22
Payer: COMMERCIAL

## 2022-09-22 VITALS
WEIGHT: 180 LBS | HEART RATE: 86 BPM | BODY MASS INDEX: 31.89 KG/M2 | HEIGHT: 63 IN | TEMPERATURE: 98.2 F | OXYGEN SATURATION: 98 % | SYSTOLIC BLOOD PRESSURE: 110 MMHG | DIASTOLIC BLOOD PRESSURE: 82 MMHG | RESPIRATION RATE: 16 BRPM

## 2022-09-22 DIAGNOSIS — S13.4XXA WHIPLASH INJURY TO NECK, INITIAL ENCOUNTER: ICD-10-CM
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
## 2022-09-22 DIAGNOSIS — G44.319 ACUTE POST-TRAUMATIC HEADACHE, NOT INTRACTABLE: ICD-10-CM

## 2022-09-22 DIAGNOSIS — S16.1XXA STRAIN OF NECK MUSCLE, INITIAL ENCOUNTER: Primary | ICD-10-CM

## 2022-09-22 PROCEDURE — 99213 OFFICE O/P EST LOW 20 MIN: CPT | Performed by: PHYSICIAN ASSISTANT
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
## 2022-09-29 ENCOUNTER — OFFICE VISIT (OUTPATIENT)
Dept: FAMILY MEDICINE | Facility: CLINIC | Age: 27
End: 2022-09-29
Payer: COMMERCIAL

## 2022-09-29 VITALS
RESPIRATION RATE: 18 BRPM | SYSTOLIC BLOOD PRESSURE: 107 MMHG | BODY MASS INDEX: 33.3 KG/M2 | HEART RATE: 73 BPM | TEMPERATURE: 97.8 F | OXYGEN SATURATION: 95 % | DIASTOLIC BLOOD PRESSURE: 78 MMHG | WEIGHT: 188 LBS

## 2022-09-29 DIAGNOSIS — F43.21 SITUATIONAL DEPRESSION: ICD-10-CM

## 2022-09-29 DIAGNOSIS — S06.0X0S CONCUSSION WITHOUT LOSS OF CONSCIOUSNESS, SEQUELA (H): Primary | ICD-10-CM

## 2022-09-29 PROCEDURE — 99213 OFFICE O/P EST LOW 20 MIN: CPT | Performed by: FAMILY MEDICINE

## 2022-09-29 ASSESSMENT — PATIENT HEALTH QUESTIONNAIRE - PHQ9
10. IF YOU CHECKED OFF ANY PROBLEMS, HOW DIFFICULT HAVE THESE PROBLEMS MADE IT FOR YOU TO DO YOUR WORK, TAKE CARE OF THINGS AT HOME, OR GET ALONG WITH OTHER PEOPLE: VERY DIFFICULT
SUM OF ALL RESPONSES TO PHQ QUESTIONS 1-9: 13
SUM OF ALL RESPONSES TO PHQ QUESTIONS 1-9: 13

## 2022-09-29 NOTE — LETTER
[facility] [address] [place] [address]  Phone: [phone]    September 29, 2022        Ness Alamo  [address]  [place] [address]          To whom it may concern:    RE: Ness Alamo    Patient was seen and treated today at our clinic. Please allow patient time off work until 10/3/22 to help her recover. She may return to work on 10/3/22.    Please contact me for questions or concerns.      Sincerely,        Shun Spain, DO
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
## 2022-10-06 DIAGNOSIS — F33.41 RECURRENT MAJOR DEPRESSIVE DISORDER, IN PARTIAL REMISSION (H): ICD-10-CM

## 2022-10-06 DIAGNOSIS — F41.1 GENERALIZED ANXIETY DISORDER: ICD-10-CM

## 2022-10-06 RX ORDER — DULOXETIN HYDROCHLORIDE 30 MG/1
CAPSULE, DELAYED RELEASE ORAL
Qty: 90 CAPSULE | Refills: 0 | Status: SHIPPED | OUTPATIENT
Start: 2022-10-06 | End: 2022-10-06

## 2022-10-06 RX ORDER — DULOXETIN HYDROCHLORIDE 30 MG/1
CAPSULE, DELAYED RELEASE ORAL
Qty: 90 CAPSULE | Refills: 0 | Status: SHIPPED | OUTPATIENT
Start: 2022-10-06 | End: 2022-10-07

## 2022-10-06 NOTE — TELEPHONE ENCOUNTER
Reason for Call:  Other prescription    Detailed comments: patient needs a refill on her duloxetine 30 mg meds. Needs to be sent to [facility]. Patient only has one days worth left.     Phone Number Patient can be reached at: Cell number on file:    Telephone Information:   Mobile [phone]       Best Time: any time throughout the day     Can we leave a detailed message on this number? YES    Call taken on 10/6/2022 at 10:51 AM by Pramod Tatum

## 2022-10-06 NOTE — TELEPHONE ENCOUNTER
Patient states insurance only covered 30 tablets per bottle so she is now almost out. Please assist. Thanks!

## 2022-10-06 NOTE — TELEPHONE ENCOUNTER
Routing to prescriber/pool.  Please update sig.  LALITA Vasquez RN  [facility]

## 2022-10-07 ENCOUNTER — TELEPHONE (OUTPATIENT)
Dept: FAMILY MEDICINE | Facility: CLINIC | Age: 27
End: 2022-10-07

## 2022-10-07 DIAGNOSIS — F41.1 GENERALIZED ANXIETY DISORDER: ICD-10-CM

## 2022-10-07 DIAGNOSIS — F33.41 RECURRENT MAJOR DEPRESSIVE DISORDER, IN PARTIAL REMISSION (H): ICD-10-CM

## 2022-10-07 RX ORDER — DULOXETIN HYDROCHLORIDE 30 MG/1
CAPSULE, DELAYED RELEASE ORAL
Qty: 90 CAPSULE | Refills: 0 | Status: SHIPPED | OUTPATIENT
Start: 2022-10-07 | End: 2023-01-11

## 2022-10-07 NOTE — TELEPHONE ENCOUNTER
[facility] phone call message- patient requesting a refill:    Full Medication Name: duloxetine     Dose: 30 mg     Pharmacy confirmed as       [facility] - [place], [place] - [address]  [address]  [facility] [address]  Phone: [phone] Fax: [phone]  : Yes    Additional Comments: the pharmacy is not filling the medication they want more specific information on the medication to fill it       OK to leave a message on voice mail? Yes    Primary language: English      needed? No    Call taken on October 7, 2022 at 2:33 PM by Zachary Cope

## 2022-10-23 ENCOUNTER — HEALTH MAINTENANCE LETTER (OUTPATIENT)
Age: 27
End: 2022-10-23

## 2022-10-26 ENCOUNTER — OFFICE VISIT (OUTPATIENT)
Dept: FAMILY MEDICINE | Facility: CLINIC | Age: 27
End: 2022-10-26
Payer: COMMERCIAL

## 2022-10-26 VITALS
TEMPERATURE: 98 F | HEART RATE: 68 BPM | SYSTOLIC BLOOD PRESSURE: 112 MMHG | HEIGHT: 63 IN | OXYGEN SATURATION: 97 % | RESPIRATION RATE: 20 BRPM | BODY MASS INDEX: 32.96 KG/M2 | DIASTOLIC BLOOD PRESSURE: 75 MMHG | WEIGHT: 186 LBS

## 2022-10-26 DIAGNOSIS — Z23 HIGH PRIORITY FOR 2019-NCOV VACCINE: ICD-10-CM

## 2022-10-26 DIAGNOSIS — Z23 NEED FOR PROPHYLACTIC VACCINATION AND INOCULATION AGAINST INFLUENZA: Primary | ICD-10-CM

## 2022-10-26 DIAGNOSIS — N92.6 ABNORMAL BLEEDING IN MENSTRUAL CYCLE: ICD-10-CM

## 2022-10-26 PROCEDURE — 99213 OFFICE O/P EST LOW 20 MIN: CPT | Mod: 25 | Performed by: STUDENT IN AN ORGANIZED HEALTH CARE EDUCATION/TRAINING PROGRAM

## 2022-10-26 PROCEDURE — 0124A COVID-19,PF,PFIZER BOOSTER BIVALENT: CPT | Performed by: STUDENT IN AN ORGANIZED HEALTH CARE EDUCATION/TRAINING PROGRAM

## 2022-10-26 PROCEDURE — 90686 IIV4 VACC NO PRSV 0.5 ML IM: CPT | Performed by: STUDENT IN AN ORGANIZED HEALTH CARE EDUCATION/TRAINING PROGRAM

## 2022-10-26 PROCEDURE — 90471 IMMUNIZATION ADMIN: CPT | Performed by: STUDENT IN AN ORGANIZED HEALTH CARE EDUCATION/TRAINING PROGRAM

## 2022-10-26 PROCEDURE — 91312 COVID-19,PF,PFIZER BOOSTER BIVALENT: CPT | Performed by: STUDENT IN AN ORGANIZED HEALTH CARE EDUCATION/TRAINING PROGRAM
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
## 2023-01-11 DIAGNOSIS — F33.41 RECURRENT MAJOR DEPRESSIVE DISORDER, IN PARTIAL REMISSION (H): ICD-10-CM

## 2023-01-11 DIAGNOSIS — F41.1 GENERALIZED ANXIETY DISORDER: ICD-10-CM

## 2023-01-11 RX ORDER — DULOXETIN HYDROCHLORIDE 30 MG/1
CAPSULE, DELAYED RELEASE ORAL
Qty: 90 CAPSULE | Refills: 0 | Status: SHIPPED | OUTPATIENT
Start: 2023-01-11 | End: 2023-05-11

## 2023-04-06 ENCOUNTER — TELEPHONE (OUTPATIENT)
Dept: FAMILY MEDICINE | Facility: CLINIC | Age: 28
End: 2023-04-06
Payer: COMMERCIAL

## 2023-04-06 DIAGNOSIS — B35.4 TINEA CORPORIS: Primary | ICD-10-CM

## 2023-04-06 NOTE — TELEPHONE ENCOUNTER
[facility] phone call message- patient requesting a refill:    Full Medication Name: clotrimazole (LOTRIMIN) 1 % external cream     Dose: Apply topically 2 times daily    Pharmacy confirmed as   [facility] - [place], [place] - [address]  [address]  [facility] [address]  Phone: [phone] Fax: [phone]  : Yes    Additional Comments: Pt's fungus came back under her arm.  She is wondering if she can just get a refill.     OK to leave a message on voice mail? Yes    Primary language: English      needed? No    Call taken on April 6, 2023 at 8:09 AM by Valentina Molina

## 2023-04-06 NOTE — TELEPHONE ENCOUNTER
Dr. Buckley please advise                        Thank you              Angelita Jimenez-Quan [facility]

## 2023-04-07 RX ORDER — CLOTRIMAZOLE 1 %
CREAM (GRAM) TOPICAL 2 TIMES DAILY
Qty: 30 G | Refills: 1 | Status: SHIPPED | OUTPATIENT
Start: 2023-04-07 | End: 2023-05-07

## 2023-05-04 DIAGNOSIS — F33.41 RECURRENT MAJOR DEPRESSIVE DISORDER, IN PARTIAL REMISSION (H): ICD-10-CM

## 2023-05-04 DIAGNOSIS — F41.1 GENERALIZED ANXIETY DISORDER: ICD-10-CM

## 2023-05-11 RX ORDER — DULOXETIN HYDROCHLORIDE 30 MG/1
CAPSULE, DELAYED RELEASE ORAL
Qty: 90 CAPSULE | Refills: 0 | Status: SHIPPED | OUTPATIENT
Start: 2023-05-11 | End: 2023-08-25

## 2023-05-11 NOTE — TELEPHONE ENCOUNTER
Hi Dr. Blake,     Are you able to assist with this refill? Pt has been waiting since 5/4/23 & somehow got routed to the wrong pool.    ELIO AlmazanA

## 2023-06-18 ENCOUNTER — HEALTH MAINTENANCE LETTER (OUTPATIENT)
Age: 28
End: 2023-06-18

## 2023-08-25 DIAGNOSIS — F41.1 GENERALIZED ANXIETY DISORDER: ICD-10-CM

## 2023-08-25 DIAGNOSIS — F33.41 RECURRENT MAJOR DEPRESSIVE DISORDER, IN PARTIAL REMISSION (H): ICD-10-CM

## 2023-08-25 RX ORDER — DULOXETIN HYDROCHLORIDE 30 MG/1
CAPSULE, DELAYED RELEASE ORAL
Qty: 90 CAPSULE | Refills: 3 | Status: SHIPPED | OUTPATIENT
Start: 2023-08-25

## 2023-12-22 ENCOUNTER — OFFICE VISIT (OUTPATIENT)
Dept: FAMILY MEDICINE | Facility: CLINIC | Age: 28
End: 2023-12-22
Payer: COMMERCIAL

## 2023-12-22 VITALS
RESPIRATION RATE: 18 BRPM | DIASTOLIC BLOOD PRESSURE: 76 MMHG | BODY MASS INDEX: 30.97 KG/M2 | OXYGEN SATURATION: 95 % | TEMPERATURE: 98.5 F | SYSTOLIC BLOOD PRESSURE: 108 MMHG | HEART RATE: 70 BPM | HEIGHT: 63 IN | WEIGHT: 174.8 LBS

## 2023-12-22 DIAGNOSIS — J06.9 VIRAL URI WITH COUGH: Primary | ICD-10-CM

## 2023-12-22 LAB
FLUAV AG SPEC QL IA: NEGATIVE
FLUBV AG SPEC QL IA: NEGATIVE

## 2023-12-22 PROCEDURE — 87804 INFLUENZA ASSAY W/OPTIC: CPT

## 2023-12-22 PROCEDURE — 87635 SARS-COV-2 COVID-19 AMP PRB: CPT

## 2023-12-22 PROCEDURE — 99213 OFFICE O/P EST LOW 20 MIN: CPT | Mod: GC

## 2023-12-22 ASSESSMENT — PATIENT HEALTH QUESTIONNAIRE - PHQ9: SUM OF ALL RESPONSES TO PHQ QUESTIONS 1-9: 9

## 2023-12-22 NOTE — PATIENT INSTRUCTIONS
You were seen today for an upper respiratory infection. This is likely due to a viral illness.    Symptom management:  - Get plenty of rest  - Avoid smoking and second hand smoke  - May take tylenol or ibuprofen for fever/discomfort  - Drink plenty of non-caffeinated fluids    Reasons to be seen in the emergency room:  - Develop a new fever of 100.4 or higher  - Cough changes, coughing up blood, or become short of breath  - Neck stiffness  - Chest pain  - Severe headache  - Unable to tolerate eating or drinking fluids    Otherwise, if no symptom improvement after 5 days, follow-up with your primary care provider.

## 2023-12-22 NOTE — LETTER
[facility] [address] [place] [address]  Phone: [phone]  Fax: [phone]    December 22, 2023        Gabby Alamo  [address]  [place] [address]          To whom it may concern:    RE: Gabby Alamo    Patient was seen and treated today at our clinic and missed work. Please excuse her from 12/21/23-12/26/23  due to illness.     Please contact me for questions or concerns.      Sincerely,      Sixto Galvez

## 2023-12-22 NOTE — PROGRESS NOTES
"  Assessment & Plan     Viral URI with cough  Patient presenting with cough, fatigue, myalgias, and without fever for 7 days duration. Vitals stable. Exam unremarkable.  Testing pending.  Will proceed with conservative management. Discussed Tylenol and ibuprofen for discomfort and fever, nasal saline for congestion, and oral decongestants or mucolytics.  Also discussed patient to return if worsening or new concerning symptoms.   - Influenza A & B Antigen - Clinic Collect  - Symptomatic COVID-19 Virus (Coronavirus) by PCR Nose    Return if symptoms worsen or fail to improve.    Sixto Galvez [facility] PADMAJA Pierre is a 28 year old, presenting for the following health issues:  Flu Symptoms (Coughing start getting painful, sore throat, headache when first got it, been going on for seven days)        12/22/2023    11:37 AM   Additional Questions   Roomed by ML   Accompanied by self       HPI     Acute Illness  Acute illness concerns: Coughing, phlegm.   Onset/Duration: 7 days  Symptoms:  Fever: No  Chills/Sweats: No  Headache (location?): YES- now resolved  Sinus Pressure: YES  Conjunctivitis:  No  Ear Pain: YES left sided ear pressure  Rhinorrhea: YES  Congestion: YES  Sore Throat: YES  Cough: YES-Green phlegm  Wheeze: No  Decreased Appetite: No  Nausea: No  Vomiting: No  Diarrhea: No  Dysuria/Freq.: No  Dysuria or Hematuria: No  Fatigue/Achiness: YES  Sick/Strep Exposure: Manager is sick with flu.   Therapies tried and outcome: Cough and flu symptom relief. Nyquil, theraflu.     Review of Systems   Constitutional, HEENT, cardiovascular, pulmonary, gi and gu systems are negative, except as otherwise noted.      Objective    /76 (BP Location: Right arm, Patient Position: Sitting, Cuff Size: Adult Regular)   Pulse 70   Temp 98.5  F (36.9  C) (Oral)   Resp 18   Ht 1.6 m (5' 3\")   Wt 79.3 kg (174 lb 12.8 oz)   SpO2 95%   BMI 30.96 kg/m    Body mass index is 30.96 " kg/m .  Physical Exam   GENERAL: alert and no distress  NECK: no adenopathy, no asymmetry, masses, or scars and thyroid normal to palpation  RESP: lungs clear to auscultation - no rales, rhonchi or wheezes  CV: regular rate and rhythm, normal S1 S2, no S3 or S4, no murmur, click or rub, no peripheral edema and peripheral pulses strong  ABDOMEN: soft, nontender, no hepatosplenomegaly, no masses and bowel sounds normal  MS: no gross musculoskeletal defects noted, no edema    No results found for this or any previous visit (from the past 24 hour(s)).    ----- Service Performed and Documented by Resident or Fellow ------

## 2023-12-23 LAB — SARS-COV-2 RNA RESP QL NAA+PROBE: NEGATIVE

## 2023-12-25 ENCOUNTER — E-VISIT (OUTPATIENT)
Dept: URGENT CARE | Facility: CLINIC | Age: 28
End: 2023-12-25
Payer: COMMERCIAL

## 2023-12-25 DIAGNOSIS — R06.2 WHEEZING: Primary | ICD-10-CM

## 2023-12-25 PROCEDURE — 99207 PR NON-BILLABLE SERV PER CHARTING: CPT | Performed by: NURSE PRACTITIONER

## 2023-12-25 NOTE — PATIENT INSTRUCTIONS
Dear Gabby Alamo,    We are sorry you are not feeling well. Based on the responses you provided, it is recommended that you be seen in-person in urgent care so we can better evaluate your symptoms. Please click here to find the nearest urgent care location to you.   You will not be charged for this Visit. Thank you for trusting us with your care.    Mariam Tian, CNP

## 2023-12-26 ENCOUNTER — OFFICE VISIT (OUTPATIENT)
Dept: FAMILY MEDICINE | Facility: CLINIC | Age: 28
End: 2023-12-26
Payer: COMMERCIAL

## 2023-12-26 ENCOUNTER — TELEPHONE (OUTPATIENT)
Dept: FAMILY MEDICINE | Facility: CLINIC | Age: 28
End: 2023-12-26

## 2023-12-26 VITALS
RESPIRATION RATE: 16 BRPM | TEMPERATURE: 98 F | OXYGEN SATURATION: 98 % | HEIGHT: 65 IN | SYSTOLIC BLOOD PRESSURE: 120 MMHG | BODY MASS INDEX: 29.32 KG/M2 | WEIGHT: 176 LBS | HEART RATE: 78 BPM | DIASTOLIC BLOOD PRESSURE: 79 MMHG

## 2023-12-26 DIAGNOSIS — J01.90 ACUTE BACTERIAL SINUSITIS: Primary | ICD-10-CM

## 2023-12-26 DIAGNOSIS — R05.1 ACUTE COUGH: ICD-10-CM

## 2023-12-26 DIAGNOSIS — B96.89 ACUTE BACTERIAL SINUSITIS: Primary | ICD-10-CM

## 2023-12-26 PROCEDURE — 99214 OFFICE O/P EST MOD 30 MIN: CPT | Mod: GC

## 2023-12-26 RX ORDER — BENZONATATE 100 MG/1
100 CAPSULE ORAL 3 TIMES DAILY PRN
Qty: 15 CAPSULE | Refills: 0 | Status: SHIPPED | OUTPATIENT
Start: 2023-12-26

## 2023-12-26 NOTE — PROGRESS NOTES
Preceptor Attestation:    I discussed the patient with the resident and evaluated the patient in person. I have verified the content of the note, which accurately reflects my assessment of the patient and the plan of care.   Supervising Physician:  Lindsay Diez MD

## 2023-12-26 NOTE — TELEPHONE ENCOUNTER
[facility] phone call message-patient reporting a symptom:     Symptom: FLU SYMPTOMS     Same Day Visit Offered: was seen     Additional comments:     OK to leave message on voice mail? Yes    Primary language: English      needed? No    Call taken on December 26, 2023 at 8:41 AM by Zachary Cope

## 2023-12-26 NOTE — TELEPHONE ENCOUNTER
"Pt states that she has had a persistent cough and congestion x's 10 or 11 days.  She states that the she is coughing a lot and feels like she can't breathe when she is coughing.  She is not coughing up as much phlegm as before.  She has tried cough drops, Sudafed, and Muccinex and they are not helping.  Her body is sore from all of the coughing and sometimes when she coughs she gags and nearly \"throws up\".  Pt would like to know what else can be done to help her symptoms as she is not sleeping at night?  Discussed that she needs to be seen and  assisted pt with scheduling an appt with Dr Roca for today at 11:20 AM.   Routed note to Dr Roca./Glenny Santos RN BSN   "

## 2023-12-26 NOTE — PROGRESS NOTES
"  Assessment & Plan     Acute bacterial sinusitis  Acute cough  -Discussed with patient that given the chronicity of her symptoms as well as acute worsening of her symptoms over the past few days that I suspect acute bacterial sinusitis.  I have sent the below prescription medications to her pharmacy on file.  At previous office visit COVID and influenza testing were negative.  Previous office visit notes reviewed.  In addition given her acute cough I have sent in a short course of Tessalon.  I discussed with patient that if symptoms worsen or fail to improve she can schedule an as needed follow-up visit at her earliest convenience.  Patient verbalized clear understanding and agreement to treatment plan and had no further questions or concerns at this time.  - amoxicillin-clavulanate (AUGMENTIN) 875-125 MG tablet  Dispense: 10 tablet; Refill: 0  - benzonatate (TESSALON) 100 MG capsule  Dispense: 15 capsule; Refill: 0       BMI:   Estimated body mass index is 29.29 kg/m  as calculated from the following:    Height as of this encounter: 1.651 m (5' 5\").    Weight as of this encounter: 79.8 kg (176 lb).       Return if symptoms worsen or fail to improve.    Akhil Roca DO  [facility] PADMAJA Pierre is a 28 year old, presenting for the following health issues:  Cough (States it has gotten worse), Fever, and Nausea (For the past 3 days)      12/26/2023    11:21 AM   Additional Questions   Roomed by Drhuv   Accompanied by self       HPI   Patient states that her symptom onset was 12/14/2023.  Her first symptom was a headache and about 2 days after that she developed coughing, runny nose, and mucus production.  Over the past few days she has experienced increased sinus congestion and pressure.  She reports that Benadryl, Sudafed, and TheraFlu were ineffective.  She endorses fatigue and cough.  She denies fever, chills, nausea, vomiting, chest pain coughing is worst symptom.  States she will " "become slightly short of breath if she is having a coughing fit.  She reports no concerns at this time.       Review of Systems   Constitutional, HEENT, cardiovascular, pulmonary, gi and gu systems are negative, except as otherwise noted.  ROS reviewed, see HPI for pertinent positives and negatives.  Akhil Roca DO        Objective    /79   Pulse 78   Temp 98  F (36.7  C) (Oral)   Resp 16   Ht 1.651 m (5' 5\")   Wt 79.8 kg (176 lb)   SpO2 98%   BMI 29.29 kg/m    Body mass index is 29.29 kg/m .  Physical Exam  Constitutional:       General: She is not in acute distress.     Appearance: She is not toxic-appearing.   HENT:      Head: Normocephalic and atraumatic.      Comments: Bilateral maxillary sinus tenderness noted     Right Ear: Ear canal and external ear normal.      Left Ear: Ear canal and external ear normal.      Ears:      Comments: Clear fluid present behind both tympanic membranes bilaterally.  Nonerythematous ear canal and tympanic membrane noted.     Nose: Congestion present.      Mouth/Throat:      Mouth: Mucous membranes are moist.   Cardiovascular:      Rate and Rhythm: Normal rate and regular rhythm.      Pulses: Normal pulses.      Heart sounds: No murmur heard.     No gallop.   Pulmonary:      Effort: Pulmonary effort is normal. No respiratory distress.      Breath sounds: Normal breath sounds. No wheezing or rales.   Skin:     Findings: No rash.   Neurological:      Mental Status: She is alert.         ----- Service Performed and Documented by Resident or Fellow ------  Precepted with MD Akhil Luna DO                  "

## 2024-01-07 NOTE — PROGRESS NOTES
Preceptor Attestation:    I discussed the patient with the resident and evaluated the patient in person. I have verified the content of the note, which accurately reflects my assessment of the patient and the plan of care.   Supervising Physician:  Jamal Pastrana MD.

## 2024-08-11 ENCOUNTER — HEALTH MAINTENANCE LETTER (OUTPATIENT)
Age: 29
End: 2024-08-11

## 2024-10-28 DIAGNOSIS — F41.1 GENERALIZED ANXIETY DISORDER: ICD-10-CM

## 2024-10-28 DIAGNOSIS — F33.41 RECURRENT MAJOR DEPRESSIVE DISORDER, IN PARTIAL REMISSION (H): ICD-10-CM

## 2024-10-28 RX ORDER — DULOXETIN HYDROCHLORIDE 30 MG/1
CAPSULE, DELAYED RELEASE ORAL
Qty: 90 CAPSULE | Refills: 0 | Status: SHIPPED | OUTPATIENT
Start: 2024-10-28

## 2024-11-18 ENCOUNTER — OFFICE VISIT (OUTPATIENT)
Dept: FAMILY MEDICINE | Facility: CLINIC | Age: 29
End: 2024-11-18
Payer: COMMERCIAL

## 2024-11-18 VITALS
HEART RATE: 70 BPM | OXYGEN SATURATION: 97 % | BODY MASS INDEX: 32.42 KG/M2 | TEMPERATURE: 97.7 F | RESPIRATION RATE: 14 BRPM | WEIGHT: 194.8 LBS | DIASTOLIC BLOOD PRESSURE: 87 MMHG | SYSTOLIC BLOOD PRESSURE: 118 MMHG

## 2024-11-18 DIAGNOSIS — F33.41 RECURRENT MAJOR DEPRESSIVE DISORDER, IN PARTIAL REMISSION (H): Primary | ICD-10-CM

## 2024-11-18 DIAGNOSIS — F41.1 GENERALIZED ANXIETY DISORDER: ICD-10-CM

## 2024-11-18 DIAGNOSIS — Z23 NEED FOR PROPHYLACTIC VACCINATION AND INOCULATION AGAINST INFLUENZA: ICD-10-CM

## 2024-11-18 DIAGNOSIS — Z23 HIGH PRIORITY FOR 2019-NCOV VACCINE: ICD-10-CM

## 2024-11-18 PROBLEM — S06.0X0S CONCUSSION WITHOUT LOSS OF CONSCIOUSNESS, SEQUELA (H): Status: ACTIVE | Noted: 2024-11-18

## 2024-11-18 PROBLEM — F32.1 CURRENT MODERATE EPISODE OF MAJOR DEPRESSIVE DISORDER, UNSPECIFIED WHETHER RECURRENT (H): Status: ACTIVE | Noted: 2024-11-18

## 2024-11-18 PROCEDURE — 99213 OFFICE O/P EST LOW 20 MIN: CPT | Mod: 25

## 2024-11-18 PROCEDURE — 90471 IMMUNIZATION ADMIN: CPT

## 2024-11-18 PROCEDURE — 91320 SARSCV2 VAC 30MCG TRS-SUC IM: CPT

## 2024-11-18 PROCEDURE — 90656 IIV3 VACC NO PRSV 0.5 ML IM: CPT

## 2024-11-18 PROCEDURE — 90480 ADMN SARSCOV2 VAC 1/ONLY CMP: CPT

## 2024-11-18 RX ORDER — DULOXETIN HYDROCHLORIDE 30 MG/1
30 CAPSULE, DELAYED RELEASE ORAL DAILY
Qty: 180 CAPSULE | Refills: 0 | Status: SHIPPED | OUTPATIENT
Start: 2024-11-18

## 2024-11-18 ASSESSMENT — ANXIETY QUESTIONNAIRES
GAD7 TOTAL SCORE: 12
1. FEELING NERVOUS, ANXIOUS, OR ON EDGE: NEARLY EVERY DAY
7. FEELING AFRAID AS IF SOMETHING AWFUL MIGHT HAPPEN: NOT AT ALL
IF YOU CHECKED OFF ANY PROBLEMS ON THIS QUESTIONNAIRE, HOW DIFFICULT HAVE THESE PROBLEMS MADE IT FOR YOU TO DO YOUR WORK, TAKE CARE OF THINGS AT HOME, OR GET ALONG WITH OTHER PEOPLE: SOMEWHAT DIFFICULT
5. BEING SO RESTLESS THAT IT IS HARD TO SIT STILL: NOT AT ALL
2. NOT BEING ABLE TO STOP OR CONTROL WORRYING: NEARLY EVERY DAY
GAD7 TOTAL SCORE: 12
3. WORRYING TOO MUCH ABOUT DIFFERENT THINGS: NEARLY EVERY DAY
6. BECOMING EASILY ANNOYED OR IRRITABLE: SEVERAL DAYS

## 2024-11-18 ASSESSMENT — PATIENT HEALTH QUESTIONNAIRE - PHQ9
SUM OF ALL RESPONSES TO PHQ QUESTIONS 1-9: 8
5. POOR APPETITE OR OVEREATING: MORE THAN HALF THE DAYS

## 2024-11-18 NOTE — PROGRESS NOTES
"  Assessment & Plan     Recurrent major depressive disorder, in partial remission (H)  Generalized anxiety disorder  Doing well on this dose. Increased anxiety is due to temporary stressor at work which has an end date. Discussed that 40 mg is the recommended lowest dose but it is substantially more expensive than the 30 mg which is a barrier. Sent prescription through [facility] today. Declined crisis hotline number. Offered [facility] as a resource.   - DULoxetine (CYMBALTA) 30 MG capsule; Take 1 capsule (30 mg) by mouth daily.    High priority for 2019-nCoV vaccine  Received vaccine today.     Need for prophylactic vaccination and inoculation against influenza  Received vaccine today.       BMI  Estimated body mass index is 32.42 kg/m  as calculated from the following:    Height as of 12/26/23: 1.651 m (5' 5\").    Weight as of this encounter: 88.4 kg (194 lb 12.8 oz).       No follow-ups on file.    Cristian Pierre is a 29 year old, presenting for the following health issues:  Depression (Follow up on medication)    HPI     Depression/Anxiety  Takes 30mg Cymbalta daily   Stressed because of work   No active SI   No easy access to firearms  Has good support system  Increased appetite because she has been stressed  Feels like Cymbalta is working   No panic attacks since starting medication   Has limited amount of therapy sessions through work  Is taking care of self    Would like flu and covid shot          Objective    /87   Pulse 70   Temp 97.7  F (36.5  C)   Resp 14   Wt 88.4 kg (194 lb 12.8 oz)   SpO2 97%   BMI 32.42 kg/m    Body mass index is 32.42 kg/m .  Physical Exam   GENERAL: alert and no distress  NECK: no adenopathy, no asymmetry, masses, or scars  RESP: lungs clear to auscultation - no rales, rhonchi or wheezes  CV: regular rate and rhythm, normal S1 S2, no S3 or S4, no murmur, click or rub, no peripheral edema  ABDOMEN: soft, nontender, no hepatosplenomegaly, no masses and bowel " sounds normal  MS: no gross musculoskeletal defects noted, no edema    GAD7 score: 11  PHQ9 score: 8          Signed Electronically by: Sayra Adkins DO    Today this patient was seen by and precepted with Dr. Thibodeaux

## 2024-11-18 NOTE — PROGRESS NOTES
Preceptor Attestation:    I discussed the patient with the resident and evaluated the patient in person. I have verified the content of the note, which accurately reflects my assessment of the patient and the plan of care.   Supervising Physician:  Elliot Thibodeaux MD.

## 2025-02-18 ENCOUNTER — OFFICE VISIT (OUTPATIENT)
Dept: FAMILY MEDICINE | Facility: CLINIC | Age: 30
End: 2025-02-18
Payer: COMMERCIAL

## 2025-02-18 VITALS
BODY MASS INDEX: 32.65 KG/M2 | DIASTOLIC BLOOD PRESSURE: 70 MMHG | TEMPERATURE: 97.1 F | WEIGHT: 196 LBS | HEIGHT: 65 IN | HEART RATE: 86 BPM | OXYGEN SATURATION: 96 % | RESPIRATION RATE: 16 BRPM | SYSTOLIC BLOOD PRESSURE: 109 MMHG

## 2025-02-18 DIAGNOSIS — F33.41 RECURRENT MAJOR DEPRESSIVE DISORDER, IN PARTIAL REMISSION: Primary | ICD-10-CM

## 2025-02-18 DIAGNOSIS — J01.00 ACUTE NON-RECURRENT MAXILLARY SINUSITIS: ICD-10-CM

## 2025-02-18 DIAGNOSIS — F41.1 GENERALIZED ANXIETY DISORDER: ICD-10-CM

## 2025-02-18 LAB
ALBUMIN SERPL BCG-MCNC: 4.3 G/DL (ref 3.5–5.2)
ALP SERPL-CCNC: 94 U/L (ref 40–150)
ALT SERPL W P-5'-P-CCNC: 35 U/L (ref 0–50)
ANION GAP SERPL CALCULATED.3IONS-SCNC: 12 MMOL/L (ref 7–15)
AST SERPL W P-5'-P-CCNC: 34 U/L (ref 0–45)
BASOPHILS # BLD AUTO: 0 10E3/UL (ref 0–0.2)
BASOPHILS NFR BLD AUTO: 1 %
BILIRUB SERPL-MCNC: 0.2 MG/DL
BUN SERPL-MCNC: 9 MG/DL (ref 6–20)
CALCIUM SERPL-MCNC: 9.4 MG/DL (ref 8.8–10.4)
CHLORIDE SERPL-SCNC: 103 MMOL/L (ref 98–107)
CHOLEST SERPL-MCNC: 187 MG/DL
CREAT SERPL-MCNC: 0.71 MG/DL (ref 0.51–0.95)
EGFRCR SERPLBLD CKD-EPI 2021: >90 ML/MIN/1.73M2
EOSINOPHIL # BLD AUTO: 0.3 10E3/UL (ref 0–0.7)
EOSINOPHIL NFR BLD AUTO: 3 %
ERYTHROCYTE [DISTWIDTH] IN BLOOD BY AUTOMATED COUNT: 12.1 % (ref 10–15)
FASTING STATUS PATIENT QL REPORTED: NO
FASTING STATUS PATIENT QL REPORTED: NO
GLUCOSE SERPL-MCNC: 164 MG/DL (ref 70–99)
HCO3 SERPL-SCNC: 24 MMOL/L (ref 22–29)
HCT VFR BLD AUTO: 41.2 % (ref 35–47)
HDLC SERPL-MCNC: 37 MG/DL
HGB BLD-MCNC: 13.8 G/DL (ref 11.7–15.7)
IMM GRANULOCYTES # BLD: 0 10E3/UL
IMM GRANULOCYTES NFR BLD: 0 %
LDLC SERPL CALC-MCNC: 84 MG/DL
LYMPHOCYTES # BLD AUTO: 1.5 10E3/UL (ref 0.8–5.3)
LYMPHOCYTES NFR BLD AUTO: 20 %
MCH RBC QN AUTO: 28.4 PG (ref 26.5–33)
MCHC RBC AUTO-ENTMCNC: 33.5 G/DL (ref 31.5–36.5)
MCV RBC AUTO: 85 FL (ref 78–100)
MONOCYTES # BLD AUTO: 0.4 10E3/UL (ref 0–1.3)
MONOCYTES NFR BLD AUTO: 5 %
NEUTROPHILS # BLD AUTO: 5.5 10E3/UL (ref 1.6–8.3)
NEUTROPHILS NFR BLD AUTO: 71 %
NONHDLC SERPL-MCNC: 150 MG/DL
PLATELET # BLD AUTO: 224 10E3/UL (ref 150–450)
POTASSIUM SERPL-SCNC: 4.3 MMOL/L (ref 3.4–5.3)
PROT SERPL-MCNC: 7.6 G/DL (ref 6.4–8.3)
RBC # BLD AUTO: 4.86 10E6/UL (ref 3.8–5.2)
SODIUM SERPL-SCNC: 139 MMOL/L (ref 135–145)
TRIGL SERPL-MCNC: 330 MG/DL
TSH SERPL DL<=0.005 MIU/L-ACNC: 1.61 UIU/ML (ref 0.3–4.2)
WBC # BLD AUTO: 7.7 10E3/UL (ref 4–11)

## 2025-02-18 PROCEDURE — 80061 LIPID PANEL: CPT

## 2025-02-18 PROCEDURE — 36415 COLL VENOUS BLD VENIPUNCTURE: CPT

## 2025-02-18 PROCEDURE — 84443 ASSAY THYROID STIM HORMONE: CPT

## 2025-02-18 PROCEDURE — 80053 COMPREHEN METABOLIC PANEL: CPT

## 2025-02-18 PROCEDURE — 99214 OFFICE O/P EST MOD 30 MIN: CPT | Mod: GC

## 2025-02-18 PROCEDURE — 85025 COMPLETE CBC W/AUTO DIFF WBC: CPT

## 2025-02-18 RX ORDER — BENZONATATE 200 MG/1
200 CAPSULE ORAL 2 TIMES DAILY PRN
Qty: 45 CAPSULE | Refills: 0 | Status: SHIPPED | OUTPATIENT
Start: 2025-02-18

## 2025-02-18 RX ORDER — AMOXICILLIN 500 MG/1
1000 CAPSULE ORAL 2 TIMES DAILY
Qty: 20 CAPSULE | Refills: 0 | Status: SHIPPED | OUTPATIENT
Start: 2025-02-21 | End: 2025-02-26

## 2025-02-18 RX ORDER — DULOXETIN HYDROCHLORIDE 30 MG/1
30 CAPSULE, DELAYED RELEASE ORAL DAILY
Qty: 180 CAPSULE | Refills: 0 | Status: SHIPPED | OUTPATIENT
Start: 2025-02-18

## 2025-02-18 RX ORDER — PSEUDOEPHEDRINE HCL 30 MG/1
30 TABLET, FILM COATED ORAL EVERY 4 HOURS PRN
Qty: 60 TABLET | Refills: 0 | Status: SHIPPED | OUTPATIENT
Start: 2025-02-18

## 2025-02-18 ASSESSMENT — PATIENT HEALTH QUESTIONNAIRE - PHQ9
SUM OF ALL RESPONSES TO PHQ QUESTIONS 1-9: 14
SUM OF ALL RESPONSES TO PHQ QUESTIONS 1-9: 14
10. IF YOU CHECKED OFF ANY PROBLEMS, HOW DIFFICULT HAVE THESE PROBLEMS MADE IT FOR YOU TO DO YOUR WORK, TAKE CARE OF THINGS AT HOME, OR GET ALONG WITH OTHER PEOPLE: SOMEWHAT DIFFICULT

## 2025-02-18 NOTE — PATIENT INSTRUCTIONS
Recovering from depression takes time. If you are on medications keep taking your medication, let your doctor know if you are experiencing any side effects.  Use your  Depression Survival Kit . Follow-up with therapy and/or your doctor as recommended. If you feel you might ever harm yourself or another person, you should go to the emergency room or call 911 immediately

## 2025-02-18 NOTE — PROGRESS NOTES
Assessment & Plan     # (F33.41) Recurrent major depressive disorder, in partial remission  (primary encounter diagnosis)  (F41.1) Generalized anxiety disorder  The patient presents to the clinic for duloxetine refill. She is tolerating the medication well. While she has additional stressor on her life, she believes that her current dose of duloxetine is fine for now. Discuss with obtaining labs considering she has taken this medication for the past year and she is understandable and agreeable to the plan.   - Comprehensive metabolic panel pending,  - CBC with Platelets & Differential pending  - TSH WITH FREE T4 REFLEX pending  - Lipid panel reflex to direct LDL pending   - DULoxetine (CYMBALTA) 30 MG capsule daily, 180 capsules, 0 refills.   - Return in 3-6 months for follow up.     # (J01.00) Acute non-recurrent maxillary sinusitis  The patient also presents with congestion, sinus pressure, nasal drainage, cough, and chest wall pain for the past week. She is also experiencing nausea in the last two days. She is afebrile and the rest of her vitals are WNL. Exam is positive for frontal and maxillary sinus tenderness to palpation, bulging bilateral Tms with no erythema and chest wall tenderness to palpation. Discussed with the patient that she may continue to use tylenol and ibuprofen for her headache and chest wall pain, will prescribed sudafed and tessalon perles for cough management. Considering that the patient is on Day 7 of her sinus pressure, will do a wait-and-see approach on prescribing antibiotics; Amoxicillin 1000mg BID is prescribed but the patient was counseled that she can pick this prescription up on 2/21 when she hits Day 10 of her illness. The patient expresses understanding and agrees with the plan.   - pseudoePHEDrine (SUDAFED) 30 MG tablet every 4 hours PRN  - benzonatate (TESSALON) 200 MG capsule, TID PRN   - amoxicillin (AMOXIL) 500 MG capsule, two capsules BID (2/21- 2/26).        Subjective    Gabby is a 29 year old, presenting for the following health issues:  Medication Refill, Cough (Has been a week. Sore throat), and Medication Request (Antibiotic )    HPI   Gabby Alamo is a 29 year old female who presents to the clinic for medication refill and cough.   1) The patient wishes to refill her duloxetine 30 mg.  She did on this medication for the past year and reports that she is tolerating this well.  She has happy moments on this medication.  In the last 3 months she has been way about losing her job due to the current presidential administration.  Otherwise, the patient is tolerating the medication well.    2) in the last week, the patient reports of congestion, nasal drainage, sore throat, cough, headaches, and chest wall pain in the last week.  She also reports of nausea in the last 2 days.  She has been taking Mucinex D which does not help with her symptoms.  She also took Benadryl and Tylenol to help with sleep but this does not help as well.  Patient denies of fevers, chills, shortness of breath, abdominal pain, vomiting, visual changes.  Patient is unsure about known exposures but she has been around multiple people in the last month as she thrown community parties once a month.    Depression and Anxiety   How are you doing with your depression since your last visit? 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  Worsened;   Are you having other symptoms that might be associated with depression or anxiety? No  Have you had a significant life event? Job Concerns   Do you have any concerns with your use of alcohol or other drugs? No    Social History     Tobacco Use    Smoking status: Never    Smokeless tobacco: Never   Vaping Use    Vaping status: Never Used   Substance Use Topics    Alcohol use: Yes     Alcohol/week: 0.0 standard drinks of alcohol  "    Comment: 2-3 drinks per week    Drug use: No         12/22/2023     1:12 PM 11/18/2024     8:58 AM 2/18/2025     9:25 AM   PHQ   PHQ-9 Total Score 9 8 14    Q9: Thoughts of better off dead/self-harm past 2 weeks Several days Not at all Not at all        Proxy-reported         11/24/2021     8:51 AM 5/27/2022    10:40 AM 11/18/2024     8:59 AM   RICHELLE-7 SCORE   Total Score 15 2 12         2/18/2025     9:25 AM   Last PHQ-9   1.  Little interest or pleasure in doing things 1    2.  Feeling down, depressed, or hopeless 2    3.  Trouble falling or staying asleep, or sleeping too much 3    4.  Feeling tired or having little energy 3    5.  Poor appetite or overeating 3    6.  Feeling bad about yourself 1    7.  Trouble concentrating 0    8.  Moving slowly or restless 1    Q9: Thoughts of better off dead/self-harm past 2 weeks 0    PHQ-9 Total Score 14    Difficulty at work, home, or with people Somewhat difficult       Proxy-reported       Objective    /70   Pulse 86   Temp 97.1  F (36.2  C) (Tympanic)   Resp 16   Ht 1.651 m (5' 5\")   Wt 88.9 kg (196 lb)   LMP  (LMP Unknown)   SpO2 96%   BMI 32.62 kg/m    Body mass index is 32.62 kg/m .    Physical Exam   Constitutional: Awake, alert, cooperative, no apparent distress, and appears stated age  Eyes: Lids and lashes normal, pupils equal, round and reactive to light, extra ocular muscles intact, sclera clear, conjunctiva normal  ENT: Normocephalic, without obvious abnormality, atraumatic, frontal and maxillary sinus tenderness, external ears without lesions, bilateral Tms are bulging, but not erythematous, oral pharynx with moist mucous membranes, tonsils without erythema or exudates, gums normal and good dentition.  Heme/Lymph: No cervical lymphadenopathy.  Respiratory: No increased work of breathing, good air exchange, clear to auscultation bilaterally, no crackles or wheezing  Cardiovascular: Regular rate and rhythm, normal S1 and S2, no S3 or S4, and no " murmur noted  GI: No scars, normal bowel sounds, soft, non-distended, non-tender, no masses palpated, no hepatosplenomegaly  Skin: normal skin color, texture, turgor  Musculoskeletal: Sternal tenderness to palpation along with bilateral rib tenderness. There is no redness, warmth, or swelling of the joints.  Full range of motion noted.    Neurologic: Awake, alert, oriented to name, place and time.  Cranial nerves II-XII are grossly intact.   Neuropsychiatric: General: normal, calm, and normal eye contact       I staffed this patient with Attending Physician, Dr. Álvaro Malagon.    Signed Electronically by: Susanne Moeller DO, PGY1    Answers submitted by the patient for this visit:  Patient Health Questionnaire (Submitted on 2/18/2025)  If you checked off any problems, how difficult have these problems made it for you to do your work, take care of things at home, or get along with other people?: Somewhat difficult  PHQ9 TOTAL SCORE: 14

## 2025-02-18 NOTE — PROGRESS NOTES
Preceptor Attestation:    I discussed the patient with the resident and evaluated the patient in person. I have verified the content of the note, which accurately reflects my assessment of the patient and the plan of care.   Supervising Physician:  Álvaro Malagon MD.

## 2025-02-26 ENCOUNTER — OFFICE VISIT (OUTPATIENT)
Dept: FAMILY MEDICINE | Facility: CLINIC | Age: 30
End: 2025-02-26
Payer: COMMERCIAL

## 2025-02-26 VITALS
OXYGEN SATURATION: 97 % | WEIGHT: 190 LBS | TEMPERATURE: 98 F | SYSTOLIC BLOOD PRESSURE: 108 MMHG | DIASTOLIC BLOOD PRESSURE: 75 MMHG | HEART RATE: 73 BPM | RESPIRATION RATE: 16 BRPM | BODY MASS INDEX: 31.62 KG/M2

## 2025-02-26 DIAGNOSIS — Z30.9 ENCOUNTER FOR CONTRACEPTIVE MANAGEMENT, UNSPECIFIED TYPE: Primary | ICD-10-CM

## 2025-02-26 NOTE — PROGRESS NOTES
"  Assessment & Plan     Encounter for contraceptive management, unspecified type  Patient was educated that the Nexplanon is good for 3 to 5 years.  Occasionally episodes of bleeding may occur with the Nexplanon.  Therefore, Nexplanon malfunction is highly unlikely.  Patient was reassured on this.  Patient was told to follow-up with us or call if she continues to have menstrual bleeding that is consistent (ie every month), especially in context of sexual activity.      BMI  Estimated body mass index is 31.62 kg/m  as calculated from the following:    Height as of 2/18/25: 1.651 m (5' 5\").    Weight as of this encounter: 86.2 kg (190 lb).       Discussed  this patient with Dr. Malagon, agrees with assessment and plan       No follow-ups on file.    Cristian Pierre is a 29 year old, presenting for the following health issues:  Other (Replace nexplanon removal and placement )    HPI   This is a 29 year-old female who is otherwise healthy presenting for concerns Nexplanon  Malfunction.  Patient had her Nexplanon inserted in August 2022.  She was amenorrheic since then.  However, she had menstrual bleeding last month.  Denies any other episodes of bleeding.  This episode was less heavy compared to her prior past menstrual episodes.  Patient is concerned that her Nexplanon is not working.  She denies being sexually active and is not concerned of being pregnant at this time.  Patient has no other concerns.         Objective    /75   Pulse 73   Temp 98  F (36.7  C) (Oral)   Resp 16   Wt 86.2 kg (190 lb)   LMP  (LMP Unknown)   SpO2 97%   BMI 31.62 kg/m    Body mass index is 31.62 kg/m .  Physical Exam   GENERAL: alert and no distress  NECK: no adenopathy, no asymmetry, masses, or scars  RESP: lungs clear to auscultation - no rales, rhonchi or wheezes  CV: regular rate and rhythm, normal S1 S2, no S3 or S4, no murmur, click or rub, no peripheral edema  ABDOMEN: soft, nontender, no hepatosplenomegaly, no masses " and bowel sounds normal  MS: no gross musculoskeletal defects noted, no edema  SKIN: no suspicious lesions or rashes  NEURO: Normal strength and tone, mentation intact and speech normal  PSYCH: mentation appears normal, affect normal/bright            Signed Electronically by: Denita Yeager MD

## 2025-04-11 ENCOUNTER — OFFICE VISIT (OUTPATIENT)
Dept: FAMILY MEDICINE | Facility: CLINIC | Age: 30
End: 2025-04-11
Payer: COMMERCIAL

## 2025-04-11 VITALS
TEMPERATURE: 98.5 F | DIASTOLIC BLOOD PRESSURE: 84 MMHG | OXYGEN SATURATION: 99 % | WEIGHT: 193 LBS | RESPIRATION RATE: 16 BRPM | HEART RATE: 73 BPM | BODY MASS INDEX: 34.2 KG/M2 | SYSTOLIC BLOOD PRESSURE: 124 MMHG | HEIGHT: 63 IN

## 2025-04-11 DIAGNOSIS — D22.9 MELANOCYTIC NEVUS, UNSPECIFIED LOCATION: Primary | ICD-10-CM

## 2025-04-11 PROCEDURE — 99213 OFFICE O/P EST LOW 20 MIN: CPT | Mod: GC

## 2025-04-11 PROCEDURE — 3074F SYST BP LT 130 MM HG: CPT

## 2025-04-11 PROCEDURE — 3079F DIAST BP 80-89 MM HG: CPT

## 2025-04-11 ASSESSMENT — PATIENT HEALTH QUESTIONNAIRE - PHQ9
SUM OF ALL RESPONSES TO PHQ QUESTIONS 1-9: 7
SUM OF ALL RESPONSES TO PHQ QUESTIONS 1-9: 7
10. IF YOU CHECKED OFF ANY PROBLEMS, HOW DIFFICULT HAVE THESE PROBLEMS MADE IT FOR YOU TO DO YOUR WORK, TAKE CARE OF THINGS AT HOME, OR GET ALONG WITH OTHER PEOPLE: SOMEWHAT DIFFICULT

## 2025-04-11 NOTE — PROGRESS NOTES
"  Assessment & Plan     Melanocytic nevus, unspecified location  Patient has a nevus that not suspicious for malignancy. Symmetic, clear borders, color same throughout, small diameter. Unable to tell if it has evolved over time for the patient noticed it yesterday. Instructed patient to keep an eye on it over the next 6 months and if it has changed to come back in. Patient agreeable to the plan with no questions or concerns.     BMI  Estimated body mass index is 34.19 kg/m  as calculated from the following:    Height as of this encounter: 1.6 m (5' 3\").    Weight as of this encounter: 87.5 kg (193 lb).     No follow-ups on file.    Cristian Pierre is a 29 year old, presenting for the following health issues:  Mole      4/11/2025     3:29 PM   Additional Questions   Roomed by Cedrick PATEL   Accompanied by Self         4/11/2025    Information    services provided? No     HPI      Patient has a mole that she would like checked out. Her partner noticed it yesterday. No additional questions or concerns.       Objective    /84   Pulse 73   Temp 98.5  F (36.9  C) (Tympanic)   Resp 16   Ht 1.6 m (5' 3\")   Wt 87.5 kg (193 lb)   LMP  (LMP Unknown)   SpO2 99%   BMI 34.19 kg/m    Body mass index is 34.19 kg/m .  Physical Exam   Skin: one,  0.5 cm in diameter round nevus. Symmetric, clear border, hyper pigmented and dark although the color is the same throughout        Signed Electronically by: Loyd aWll MD  Staffed with Álvaro Malagon MD  "

## 2025-06-30 ENCOUNTER — OFFICE VISIT (OUTPATIENT)
Dept: FAMILY MEDICINE | Facility: CLINIC | Age: 30
End: 2025-06-30
Payer: COMMERCIAL

## 2025-06-30 VITALS
RESPIRATION RATE: 16 BRPM | OXYGEN SATURATION: 97 % | TEMPERATURE: 98.1 F | SYSTOLIC BLOOD PRESSURE: 120 MMHG | WEIGHT: 185 LBS | BODY MASS INDEX: 32.77 KG/M2 | HEART RATE: 63 BPM | DIASTOLIC BLOOD PRESSURE: 84 MMHG

## 2025-06-30 DIAGNOSIS — F33.41 RECURRENT MAJOR DEPRESSIVE DISORDER, IN PARTIAL REMISSION: Primary | ICD-10-CM

## 2025-06-30 DIAGNOSIS — B35.4 TINEA CORPORIS: ICD-10-CM

## 2025-06-30 DIAGNOSIS — F41.1 GENERALIZED ANXIETY DISORDER: ICD-10-CM

## 2025-06-30 RX ORDER — DULOXETIN HYDROCHLORIDE 30 MG/1
30 CAPSULE, DELAYED RELEASE ORAL DAILY
Qty: 100 CAPSULE | Refills: 3 | Status: SHIPPED | OUTPATIENT
Start: 2025-06-30

## 2025-06-30 RX ORDER — PRENATAL VIT 91/IRON/FOLIC/DHA 28-975-200
COMBINATION PACKAGE (EA) ORAL DAILY
Qty: 28 G | Refills: 0 | Status: SHIPPED | OUTPATIENT
Start: 2025-06-30

## 2025-06-30 ASSESSMENT — PATIENT HEALTH QUESTIONNAIRE - PHQ9
SUM OF ALL RESPONSES TO PHQ QUESTIONS 1-9: 7
10. IF YOU CHECKED OFF ANY PROBLEMS, HOW DIFFICULT HAVE THESE PROBLEMS MADE IT FOR YOU TO DO YOUR WORK, TAKE CARE OF THINGS AT HOME, OR GET ALONG WITH OTHER PEOPLE: SOMEWHAT DIFFICULT
SUM OF ALL RESPONSES TO PHQ QUESTIONS 1-9: 7

## 2025-06-30 NOTE — PROGRESS NOTES
"  Assessment & Plan     Recurrent major depressive disorder, in partial remission  Generalized anxiety disorder  Refill  - DULoxetine (CYMBALTA) 30 MG capsule  Dispense: 100 capsule; Refill: 3    Tinea corporis  Patient with a recurrent, pruritic rash in the left axilla concerning for tinea corporis versus contact dermatitis. Given the characteristic appearance and history of prior response to antifungal therapy, tinea is favored. However, lack of improvement with OTC clotrimazole raises the possibility of treatment failure or alternative diagnosis such as contact dermatitis. Will treat with terbinafine for a week and if this does not work we will switch to a steroid cream. Patient is agreeable to the plan with additional questions or concerns.   - terbinafine (LAMISIL) 1 % external cream  Dispense: 28 g; Refill: 0    BMI  Estimated body mass index is 32.77 kg/m  as calculated from the following:    Height as of 4/11/25: 1.6 m (5' 3\").    Weight as of this encounter: 83.9 kg (185 lb).     Cristian Pierre is a 29 year old, presenting for the following health issues:  Other (Left arm pit rash for 2 weeks )        6/30/2025     8:06 AM   Additional Questions   Roomed by je   Accompanied by self         6/30/2025    Information    services provided? No     HPI      The patient presents with a recurrent fungal rash in the left axilla for the past two weeks. They report having had a similar rash in the past that responded well to prescription clotrimazole 1% cream. This time, they obtained an over-the-counter version of clotrimazole 1% but note that it has not improved the rash, which remains pruritic. The rash is localized to the left axilla and has not spread. No associated fevers, drainage, or systemic symptoms.    Would also like refill of duloxetine she has been stable on this for the past 6 months           Objective    /84   Pulse 63   Temp 98.1  F (36.7  C) (Oral)   Resp 16   " Wt 83.9 kg (185 lb)   SpO2 97%   BMI 32.77 kg/m    Body mass index is 32.77 kg/m .  Physical Exam   Skin: Left axilla with an erythematous, well-demarcated, scaly patch with central clearing and peripheral scaling, consistent with tinea corporis. No evidence of pustules, drainage, or secondary bacterial infection. No lymphadenopathy. Right axilla and other skin surfaces clear.        Signed Electronically by: Loyd Wall MD  Staffed with Dr. Pastrana

## 2025-08-17 ENCOUNTER — HEALTH MAINTENANCE LETTER (OUTPATIENT)
Age: 30
End: 2025-08-17

## 2025-08-23 ENCOUNTER — IMMUNIZATION (OUTPATIENT)
Age: 30
End: 2025-08-23

## 2025-08-23 PROCEDURE — 90715 TDAP VACCINE 7 YRS/> IM: CPT

## 2025-08-23 PROCEDURE — 90471 IMMUNIZATION ADMIN: CPT
